# Patient Record
Sex: FEMALE | Race: WHITE | Employment: FULL TIME | URBAN - METROPOLITAN AREA
[De-identification: names, ages, dates, MRNs, and addresses within clinical notes are randomized per-mention and may not be internally consistent; named-entity substitution may affect disease eponyms.]

---

## 2017-03-27 ENCOUNTER — OFFICE VISIT (OUTPATIENT)
Dept: DERMATOLOGY | Age: 37
End: 2017-03-27

## 2017-03-27 DIAGNOSIS — L81.4 LENTIGO: ICD-10-CM

## 2017-03-27 DIAGNOSIS — D22.9 MULTIPLE NEVI: Primary | ICD-10-CM

## 2017-03-27 DIAGNOSIS — D48.5 NEOPLASM OF UNCERTAIN BEHAVIOR OF SKIN: ICD-10-CM

## 2017-03-27 DIAGNOSIS — L80 VITILIGO: ICD-10-CM

## 2017-03-27 DIAGNOSIS — L40.9 PSORIASIS: ICD-10-CM

## 2017-03-27 PROCEDURE — 99214 OFFICE O/P EST MOD 30 MIN: CPT | Performed by: DERMATOLOGY

## 2017-03-27 PROCEDURE — 11100 PR BIOPSY OF SKIN LESION: CPT | Performed by: DERMATOLOGY

## 2017-03-31 ENCOUNTER — TELEPHONE (OUTPATIENT)
Dept: DERMATOLOGY | Age: 37
End: 2017-03-31

## 2017-07-11 ENCOUNTER — OFFICE VISIT (OUTPATIENT)
Dept: FAMILY MEDICINE CLINIC | Age: 37
End: 2017-07-11

## 2017-07-11 VITALS
SYSTOLIC BLOOD PRESSURE: 112 MMHG | HEIGHT: 65 IN | HEART RATE: 82 BPM | BODY MASS INDEX: 22.82 KG/M2 | RESPIRATION RATE: 12 BRPM | DIASTOLIC BLOOD PRESSURE: 84 MMHG | WEIGHT: 137 LBS

## 2017-07-11 DIAGNOSIS — E03.9 ACQUIRED HYPOTHYROIDISM: Primary | ICD-10-CM

## 2017-07-11 LAB
T4 FREE: 0.9 NG/DL (ref 0.9–1.8)
TSH REFLEX: 5.45 UIU/ML (ref 0.27–4.2)

## 2017-07-11 PROCEDURE — 99213 OFFICE O/P EST LOW 20 MIN: CPT | Performed by: FAMILY MEDICINE

## 2017-07-11 PROCEDURE — 36415 COLL VENOUS BLD VENIPUNCTURE: CPT | Performed by: FAMILY MEDICINE

## 2017-07-11 RX ORDER — LEVOTHYROXINE SODIUM 88 UG/1
88 TABLET ORAL DAILY
Qty: 30 TABLET | Refills: 0 | Status: CANCELLED | OUTPATIENT
Start: 2017-07-11

## 2017-07-11 ASSESSMENT — PATIENT HEALTH QUESTIONNAIRE - PHQ9
2. FEELING DOWN, DEPRESSED OR HOPELESS: 0
SUM OF ALL RESPONSES TO PHQ QUESTIONS 1-9: 0
1. LITTLE INTEREST OR PLEASURE IN DOING THINGS: 0
SUM OF ALL RESPONSES TO PHQ9 QUESTIONS 1 & 2: 0

## 2017-07-12 DIAGNOSIS — E03.9 ACQUIRED HYPOTHYROIDISM: Primary | ICD-10-CM

## 2017-07-12 RX ORDER — LEVOTHYROXINE SODIUM 0.1 MG/1
100 TABLET ORAL DAILY
Qty: 30 TABLET | Refills: 1 | Status: SHIPPED | OUTPATIENT
Start: 2017-07-12 | End: 2017-09-12 | Stop reason: SDUPTHER

## 2017-09-12 ENCOUNTER — NURSE ONLY (OUTPATIENT)
Dept: FAMILY MEDICINE CLINIC | Age: 37
End: 2017-09-12

## 2017-09-12 DIAGNOSIS — E03.9 ACQUIRED HYPOTHYROIDISM: ICD-10-CM

## 2017-09-12 LAB — TSH REFLEX: 0.46 UIU/ML (ref 0.27–4.2)

## 2017-09-12 PROCEDURE — 36415 COLL VENOUS BLD VENIPUNCTURE: CPT | Performed by: FAMILY MEDICINE

## 2017-09-14 RX ORDER — LEVOTHYROXINE SODIUM 0.1 MG/1
100 TABLET ORAL DAILY
Qty: 90 TABLET | Refills: 0 | Status: SHIPPED | OUTPATIENT
Start: 2017-09-14 | End: 2017-12-15 | Stop reason: SDUPTHER

## 2017-12-15 DIAGNOSIS — E03.9 ACQUIRED HYPOTHYROIDISM: ICD-10-CM

## 2017-12-15 RX ORDER — LEVOTHYROXINE SODIUM 0.1 MG/1
100 TABLET ORAL DAILY
Qty: 90 TABLET | Refills: 0 | Status: SHIPPED | OUTPATIENT
Start: 2017-12-15 | End: 2018-01-01 | Stop reason: DRUGHIGH

## 2017-12-29 ENCOUNTER — NURSE ONLY (OUTPATIENT)
Dept: FAMILY MEDICINE CLINIC | Age: 37
End: 2017-12-29

## 2017-12-29 DIAGNOSIS — E03.9 ACQUIRED HYPOTHYROIDISM: ICD-10-CM

## 2017-12-29 PROCEDURE — 36415 COLL VENOUS BLD VENIPUNCTURE: CPT | Performed by: FAMILY MEDICINE

## 2017-12-30 LAB
T3 TOTAL: 1 NG/ML (ref 0.8–2)
T4 FREE: 1.3 NG/DL (ref 0.9–1.8)
TSH REFLEX: 0.2 UIU/ML (ref 0.27–4.2)

## 2018-01-01 ENCOUNTER — PATIENT MESSAGE (OUTPATIENT)
Dept: FAMILY MEDICINE CLINIC | Age: 38
End: 2018-01-01

## 2018-01-01 DIAGNOSIS — E03.9 ACQUIRED HYPOTHYROIDISM: ICD-10-CM

## 2018-01-01 DIAGNOSIS — E03.9 ACQUIRED HYPOTHYROIDISM: Primary | ICD-10-CM

## 2018-01-01 RX ORDER — LEVOTHYROXINE SODIUM 88 UG/1
88 TABLET ORAL DAILY
Qty: 30 TABLET | Refills: 1 | Status: SHIPPED | OUTPATIENT
Start: 2018-01-01 | End: 2018-03-08 | Stop reason: SDUPTHER

## 2018-03-08 RX ORDER — LEVOTHYROXINE SODIUM 88 UG/1
88 TABLET ORAL DAILY
Qty: 30 TABLET | Refills: 0 | Status: SHIPPED | OUTPATIENT
Start: 2018-03-08 | End: 2018-04-06 | Stop reason: SDUPTHER

## 2018-04-05 DIAGNOSIS — E03.9 ACQUIRED HYPOTHYROIDISM: ICD-10-CM

## 2018-04-05 RX ORDER — LEVOTHYROXINE SODIUM 88 UG/1
88 TABLET ORAL DAILY
Qty: 30 TABLET | Refills: 0 | Status: CANCELLED | OUTPATIENT
Start: 2018-04-05

## 2018-04-06 DIAGNOSIS — E03.9 ACQUIRED HYPOTHYROIDISM: ICD-10-CM

## 2018-04-06 LAB — TSH REFLEX: 2.6 UIU/ML (ref 0.27–4.2)

## 2018-04-06 RX ORDER — LEVOTHYROXINE SODIUM 88 UG/1
88 TABLET ORAL DAILY
Qty: 30 TABLET | Refills: 0 | Status: SHIPPED | OUTPATIENT
Start: 2018-04-06 | End: 2018-04-09 | Stop reason: SDUPTHER

## 2018-06-04 ENCOUNTER — OFFICE VISIT (OUTPATIENT)
Dept: DERMATOLOGY | Age: 38
End: 2018-06-04

## 2018-06-04 DIAGNOSIS — L40.9 PSORIASIS: ICD-10-CM

## 2018-06-04 DIAGNOSIS — L81.4 LENTIGO: ICD-10-CM

## 2018-06-04 DIAGNOSIS — L80 VITILIGO: ICD-10-CM

## 2018-06-04 DIAGNOSIS — D22.9 MULTIPLE NEVI: Primary | ICD-10-CM

## 2018-06-04 PROCEDURE — 99214 OFFICE O/P EST MOD 30 MIN: CPT | Performed by: DERMATOLOGY

## 2018-06-04 RX ORDER — CLOBETASOL PROPIONATE 0.5 MG/G
OINTMENT TOPICAL
Qty: 60 G | Refills: 1 | Status: SHIPPED | OUTPATIENT
Start: 2018-06-04 | End: 2019-09-30

## 2018-09-24 ENCOUNTER — OFFICE VISIT (OUTPATIENT)
Dept: FAMILY MEDICINE CLINIC | Age: 38
End: 2018-09-24
Payer: COMMERCIAL

## 2018-09-24 VITALS
RESPIRATION RATE: 12 BRPM | TEMPERATURE: 98.1 F | OXYGEN SATURATION: 99 % | HEART RATE: 95 BPM | DIASTOLIC BLOOD PRESSURE: 70 MMHG | BODY MASS INDEX: 24.07 KG/M2 | SYSTOLIC BLOOD PRESSURE: 118 MMHG | HEIGHT: 64 IN | WEIGHT: 141 LBS

## 2018-09-24 DIAGNOSIS — J32.9 SINUSITIS, UNSPECIFIED CHRONICITY, UNSPECIFIED LOCATION: Primary | ICD-10-CM

## 2018-09-24 DIAGNOSIS — E03.9 ACQUIRED HYPOTHYROIDISM: ICD-10-CM

## 2018-09-24 PROCEDURE — 99213 OFFICE O/P EST LOW 20 MIN: CPT | Performed by: INTERNAL MEDICINE

## 2018-09-24 RX ORDER — CEFUROXIME AXETIL 500 MG/1
500 TABLET ORAL 2 TIMES DAILY
Qty: 20 TABLET | Refills: 0 | Status: SHIPPED | OUTPATIENT
Start: 2018-09-24 | End: 2018-10-04

## 2018-09-24 ASSESSMENT — PATIENT HEALTH QUESTIONNAIRE - PHQ9
1. LITTLE INTEREST OR PLEASURE IN DOING THINGS: 0
SUM OF ALL RESPONSES TO PHQ QUESTIONS 1-9: 0
SUM OF ALL RESPONSES TO PHQ9 QUESTIONS 1 & 2: 0
SUM OF ALL RESPONSES TO PHQ QUESTIONS 1-9: 0
2. FEELING DOWN, DEPRESSED OR HOPELESS: 0

## 2018-09-24 NOTE — PROGRESS NOTES
BILITOT <0.2 03/03/2015 0719            Lab Results   Component Value Date    WBC 4.5 03/03/2015    HGB 13.4 03/03/2015    HCT 40.6 03/03/2015    MCV 90.4 03/03/2015     03/03/2015     No results found for: LABA1C  No results found for: EAG  No results found for: LABA1C  No components found for: CHLPL  Lab Results   Component Value Date    TRIG 50 10/02/2015     Lab Results   Component Value Date    HDL 63 (H) 10/02/2015     Lab Results   Component Value Date    LDLCALC 125 (H) 10/02/2015     Lab Results   Component Value Date    LABVLDL 10 10/02/2015       Old labs and records reviewed or requested  Discussed past lab and studies with patient    Diagnosis Orders   1. Sinusitis, unspecified chronicity, unspecified location     2. Acquired hypothyroidism       Rhinocort aqua, Afrin, over-the-counter medication Mucinex D, Ceftin      Follow-up April      Diagnosis and treatment discussed.   Possible side effects of medication reviewed  Patients questions answered  Follow up understood  Pt aware if they are not contacted about any test results , this does not mean they are normal.  They should call

## 2018-09-24 NOTE — PATIENT INSTRUCTIONS
Patient Education        Saline Nasal Washes: Care Instructions  Your Care Instructions  Saline nasal washes help keep the nasal passages open by washing out thick or dried mucus. This simple remedy can help relieve symptoms of allergies, sinusitis, and colds. It also can make the nose feel more comfortable by keeping the mucous membranes moist. You may notice a little burning sensation in your nose the first few times you use the solution, but this usually gets better in a few days. Follow-up care is a key part of your treatment and safety. Be sure to make and go to all appointments, and call your doctor if you are having problems. It's also a good idea to know your test results and keep a list of the medicines you take. How can you care for yourself at home? · You can buy premixed saline solution in a squeeze bottle or other sinus rinse products at a drugstore. Read and follow the instructions on the label. · You also can make your own saline solution by adding 1 teaspoon of salt and 1 teaspoon of baking soda to 2 cups of distilled water. · If you use a homemade solution, pour a small amount into a clean bowl. Using a rubber bulb syringe, squeeze the syringe and place the tip in the salt water. Pull a small amount of the salt water into the syringe by relaxing your hand. · Sit down with your head tilted slightly back. Do not lie down. Put the tip of the bulb syringe or the squeeze bottle a little way into one of your nostrils. Gently drip or squirt a few drops into the nostril. Repeat with the other nostril. Some sneezing and gagging are normal at first.  · Gently blow your nose. · Wipe the syringe or bottle tip clean after each use. · Repeat this 2 or 3 times a day. · Use nasal washes gently if you have nosebleeds often. When should you call for help?   Watch closely for changes in your health, and be sure to contact your doctor if:    · You often get nosebleeds.     · You have problems doing the nasal washes. Where can you learn more? Go to [email or web address] and sign in to your [email or web address] account. Enter [phone] in the [facility] box to learn more about \"Saline Nasal Washes: Care Instructions. \"     If you do not have an account, please click on the \"Sign Up Now\" link. Current as of: May 12, 2017  Content Version: 11.7  © 1846-7485 [employer]. Care instructions adapted under license by [facility] ([facility]). If you have questions about a medical condition or this instruction, always ask your healthcare professional. [address] any warranty or liability for your use of this information. Patient Education        Sinusitis: Care Instructions  Your Care Instructions    Sinusitis is an infection of the lining of the sinus cavities in your head. Sinusitis often follows a cold. It causes pain and pressure in your head and face. In most cases, sinusitis gets better on its own in 1 to 2 weeks. But some mild symptoms may last for several weeks. Sometimes antibiotics are needed. Follow-up care is a key part of your treatment and safety. Be sure to make and go to all appointments, and call your doctor if you are having problems. It's also a good idea to know your test results and keep a list of the medicines you take. How can you care for yourself at home? · Take an over-the-counter pain medicine, such as acetaminophen (Tylenol), ibuprofen (Advil, Motrin), or naproxen (Aleve). Read and follow all instructions on the label. · If the doctor prescribed antibiotics, take them as directed. Do not stop taking them just because you feel better. You need to take the full course of antibiotics. · Be careful when taking over-the-counter cold or flu medicines and Tylenol at the same time. Many of these medicines have acetaminophen, which is Tylenol. Read the labels to make sure that you are not taking more than the recommended dose.  [employer] disclaims any warranty or liability for your use of this information.

## 2018-10-16 ENCOUNTER — TELEPHONE (OUTPATIENT)
Dept: FAMILY MEDICINE CLINIC | Age: 38
End: 2018-10-16

## 2018-11-04 DIAGNOSIS — E03.9 ACQUIRED HYPOTHYROIDISM: ICD-10-CM

## 2018-11-05 RX ORDER — LEVOTHYROXINE SODIUM 88 UG/1
TABLET ORAL
Qty: 90 TABLET | Refills: 1 | Status: SHIPPED | OUTPATIENT
Start: 2018-11-05 | End: 2019-05-05 | Stop reason: SDUPTHER

## 2019-05-03 ENCOUNTER — OFFICE VISIT (OUTPATIENT)
Dept: FAMILY MEDICINE CLINIC | Age: 39
End: 2019-05-03
Payer: COMMERCIAL

## 2019-05-03 VITALS
BODY MASS INDEX: 25.01 KG/M2 | HEART RATE: 90 BPM | DIASTOLIC BLOOD PRESSURE: 70 MMHG | WEIGHT: 146.5 LBS | HEIGHT: 64 IN | SYSTOLIC BLOOD PRESSURE: 112 MMHG | OXYGEN SATURATION: 99 %

## 2019-05-03 DIAGNOSIS — Z13.1 SCREENING FOR DIABETES MELLITUS: ICD-10-CM

## 2019-05-03 DIAGNOSIS — Z13.220 SCREENING FOR HYPERLIPIDEMIA: ICD-10-CM

## 2019-05-03 DIAGNOSIS — E03.9 ACQUIRED HYPOTHYROIDISM: ICD-10-CM

## 2019-05-03 DIAGNOSIS — Z00.00 WELL ADULT EXAM: Primary | ICD-10-CM

## 2019-05-03 LAB — TSH REFLEX: 3.2 UIU/ML (ref 0.27–4.2)

## 2019-05-03 PROCEDURE — 99395 PREV VISIT EST AGE 18-39: CPT | Performed by: FAMILY MEDICINE

## 2019-05-03 PROCEDURE — 36415 COLL VENOUS BLD VENIPUNCTURE: CPT | Performed by: FAMILY MEDICINE

## 2019-05-03 ASSESSMENT — PATIENT HEALTH QUESTIONNAIRE - PHQ9
2. FEELING DOWN, DEPRESSED OR HOPELESS: 0
SUM OF ALL RESPONSES TO PHQ QUESTIONS 1-9: 0
1. LITTLE INTEREST OR PLEASURE IN DOING THINGS: 0
SUM OF ALL RESPONSES TO PHQ QUESTIONS 1-9: 0
SUM OF ALL RESPONSES TO PHQ9 QUESTIONS 1 & 2: 0

## 2019-05-03 NOTE — PROGRESS NOTES
History and Physical      Jesi Lewis  YOB: 1980    Date of Service:  5/3/2019    Chief Complaint:   Jesi Lewis is a 41 y.o. female who presents for complete physical examination. HPI:     Hypothyroidism s/p thyroidectomy due to graves: Recent symptoms: none. She denies fatigue, weight gain, weight loss, cold intolerance, heat intolerance, hair loss, dry skin, constipation, diarrhea, edema, anxiety, tremor, palpitations and dysphagia. Patient is  taking her medication consistently on an empty stomach.     Lab Results   Component Value Date    TSHREFLEX 2.60 04/06/2018    TSHREFLEX 0.20 12/29/2017    TSHREFLEX 0.46 09/12/2017     Lab Results   Component Value Date    TSH 0.07 (L) 03/03/2015    TSH 0.02 (L) 02/06/2015    TSH 1.48 04/04/2011         Wt Readings from Last 3 Encounters:   05/03/19 146 lb 8 oz (66.5 kg)   09/24/18 141 lb (64 kg)   07/11/17 137 lb (62.1 kg)     BP Readings from Last 3 Encounters:   05/03/19 112/70   09/24/18 118/70   07/11/17 112/84       Patient Active Problem List   Diagnosis    Infertility    Graves disease    Hypothyroidism    Vitiligo    Psoriasis    Keratoconus of both eyes    Corneal transplant rejection       Preventive Care:  Health Maintenance   Topic Date Due    Varicella Vaccine (1 of 2 - 13+ 2-dose series) 08/11/1995    HIV screen  08/11/1997    Cervical cancer screen  08/07/2016    TSH testing  04/06/2019    Flu vaccine (Season Ended) 09/01/2019    DTaP/Tdap/Td vaccine (2 - Td) 12/19/2026    Pneumococcal 0-64 years Vaccine  Aged Out      Hx abnormal PAP: yes - over 10 years ago, all normal since  Sexual activity: single partner, contraception - none :  fertility issues  Self-breast exams: yes  Last eye exam: 2 weeks ago had eyemap, normal  Exercise: no regular exercise  Seatbelt use: yes  Lipid panel:   Lab Results   Component Value Date    CHOL 198 10/02/2015    TRIG 50 10/02/2015    HDL 63 (H) 10/02/2015    LDLCALC 125 (H) 10/02/2015 well-nourished. No distress. HEENT:   Head: Normocephalic and atraumatic. Right Ear: Tympanic membrane, external ear and ear canal normal.   Left Ear: Tympanic membrane, external ear and ear canal normal.   Nose: Nose normal.   Mouth/Throat: Oropharynx is clear and moist, and mucous membranes are normal.  There is no cervical adenopathy. Eyes: Conjunctivae and extraocular motions are normal. Pupils are equal, round, and reactive to light. Neck: Neck supple. No JVD present. Carotid bruit is not present. No mass and no thyromegaly present. Cardiovascular: Normal rate, regular rhythm, normal heart sounds and intact distal pulses. Exam reveals no gallop and no friction rub. No murmur heard. Pulmonary/Chest: Effort normal and breath sounds normal. No respiratory distress. She has no wheezes, rhonchi or rales. Abdominal: Soft, non-tender. Bowel sounds and aorta are normal. She exhibits no organomegaly, mass or bruit. Genitourinary: performed by gynecologist.  Breast exam:   performed by gynecologist.  Musculoskeletal: Normal range of motion, no synovitis. She exhibits no edema. Neurological: She is alert and oriented to person, place, and time. She has normal reflexes. No cranial nerve deficit. Coordination normal.   Skin: Skin is warm and dry. There is no rash or erythema. No suspicious lesions noted. Psychiatric: She has a normal mood and affect. Her speech is normal and behavior is normal. Judgment, cognition and memory are normal.     Assessment/Plan:    Breann Elkins was seen today for hypothyroidism. Diagnoses and all orders for this visit:    Well adult exam  Normal exam.  Patient has 3year-old twins and finds it hard to find time for exercise. She is trying to go on family walks and get activity with them outside as much as possible. She does eat healthy. Patient to continue. Will abstract Pap smear from care everywhere into chart.     Checking fasting lipid panel and fasting glucose at nurses visit next week since not fasting today. Acquired hypothyroidism  Checking TSH.   Will adjust medicine appropriately.  -     TSH with Reflex

## 2019-05-05 DIAGNOSIS — E03.9 ACQUIRED HYPOTHYROIDISM: ICD-10-CM

## 2019-05-05 RX ORDER — LEVOTHYROXINE SODIUM 88 UG/1
TABLET ORAL
Qty: 90 TABLET | Refills: 3 | Status: SHIPPED | OUTPATIENT
Start: 2019-05-05 | End: 2020-05-11 | Stop reason: SDUPTHER

## 2019-05-06 DIAGNOSIS — Z13.1 SCREENING FOR DIABETES MELLITUS: Primary | ICD-10-CM

## 2019-05-06 DIAGNOSIS — Z13.220 SCREENING FOR HYPERLIPIDEMIA: ICD-10-CM

## 2019-05-08 ENCOUNTER — NURSE ONLY (OUTPATIENT)
Dept: FAMILY MEDICINE CLINIC | Age: 39
End: 2019-05-08
Payer: COMMERCIAL

## 2019-05-08 DIAGNOSIS — Z13.220 SCREENING FOR HYPERLIPIDEMIA: ICD-10-CM

## 2019-05-08 DIAGNOSIS — Z13.1 SCREENING FOR DIABETES MELLITUS: ICD-10-CM

## 2019-05-08 LAB
CHOLESTEROL, TOTAL: 229 MG/DL (ref 0–199)
GLUCOSE BLD-MCNC: 84 MG/DL (ref 70–99)
HDLC SERPL-MCNC: 62 MG/DL (ref 40–60)
LDL CHOLESTEROL CALCULATED: 156 MG/DL
TRIGL SERPL-MCNC: 53 MG/DL (ref 0–150)
VLDLC SERPL CALC-MCNC: 11 MG/DL

## 2019-05-08 PROCEDURE — 36415 COLL VENOUS BLD VENIPUNCTURE: CPT | Performed by: FAMILY MEDICINE

## 2019-05-09 DIAGNOSIS — E03.9 ACQUIRED HYPOTHYROIDISM: ICD-10-CM

## 2019-05-10 RX ORDER — LEVOTHYROXINE SODIUM 88 UG/1
TABLET ORAL
Qty: 90 TABLET | Refills: 3 | OUTPATIENT
Start: 2019-05-10

## 2019-06-18 ENCOUNTER — OFFICE VISIT (OUTPATIENT)
Dept: DERMATOLOGY | Age: 39
End: 2019-06-18
Payer: COMMERCIAL

## 2019-06-18 DIAGNOSIS — L80 VITILIGO: ICD-10-CM

## 2019-06-18 DIAGNOSIS — L40.9 PSORIASIS: ICD-10-CM

## 2019-06-18 DIAGNOSIS — L81.4 LENTIGO: ICD-10-CM

## 2019-06-18 DIAGNOSIS — D22.9 MULTIPLE NEVI: Primary | ICD-10-CM

## 2019-06-18 PROCEDURE — 99214 OFFICE O/P EST MOD 30 MIN: CPT | Performed by: DERMATOLOGY

## 2019-06-18 RX ORDER — PIMECROLIMUS 10 MG/G
CREAM TOPICAL
Qty: 60 G | Refills: 1 | Status: SHIPPED | OUTPATIENT
Start: 2019-06-18 | End: 2019-09-30

## 2019-06-18 RX ORDER — CALCIPOTRIENE AND BETAMETHASONE DIPROPIONATE 50; .5 UG/G; MG/G
AEROSOL, FOAM TOPICAL
Qty: 60 G | Refills: 2 | Status: SHIPPED | OUTPATIENT
Start: 2019-06-18 | End: 2021-12-13

## 2019-06-28 ENCOUNTER — TELEPHONE (OUTPATIENT)
Dept: DERMATOLOGY | Age: 39
End: 2019-06-28

## 2019-06-28 NOTE — TELEPHONE ENCOUNTER
Pt calling states she woke up with her eye burning she have been using a generic form of Elidel on it want to know if she can get something to use on it pls return patient call back @ [phone] to discuss

## 2019-07-01 NOTE — TELEPHONE ENCOUNTER
Spoke to patient regarding concerns that using elidel on her eyelids are causing both eyes to burn. Patient started using elidel on Thursday 6/27/2019 in the evening before bed and in the morning her eyes were on fire. Patient did explain that she also wears two contacts in both eyes, but has not had the burning before using the product. Patient did not use elidel last night (patient used vaseline only) and today her eyes are ok?

## 2019-07-02 NOTE — TELEPHONE ENCOUNTER
Elidel can cause burning symptoms sometimes on the skin. Is it her eyes themselves or the eyelid skin that is burning and irritated?

## 2019-09-06 ENCOUNTER — TELEPHONE (OUTPATIENT)
Dept: DERMATOLOGY | Age: 39
End: 2019-09-06

## 2019-09-06 NOTE — TELEPHONE ENCOUNTER
Patient of . Patient called today stating she was to call and let Dr. Pari Leiva know how the cream for her eyes, Elicel, is working. She has stated it is actually getting worse. Eyes burn \"like a blow torch\". Last used on 9-5-19. Patient has discontinued use of this medication. She can be reached at [phone].   Thanks

## 2019-09-09 NOTE — TELEPHONE ENCOUNTER
Has she been using it consistently without a problem again for the last few months or did she just start using it again? I know that she had burning symptoms in the spring when she called and aquaphor only was recommended until the symptoms subsided. Has she tried aquaphor again recently? There are other options that I can switch her to - I'd like to avoid steroids on the eyelid but we can try protopic ointment instead.

## 2019-09-10 RX ORDER — TACROLIMUS 1 MG/G
OINTMENT TOPICAL
Qty: 60 G | Refills: 3 | Status: SHIPPED | OUTPATIENT
Start: 2019-09-10 | End: 2021-12-13

## 2019-09-29 NOTE — PROGRESS NOTES
Preoperative Consultation    Reed Crowe MD  [facility]) Physicians  [address] [address]  [phone] office  [phone] fax      Kalpana Vasquez  YOB: 1980    Date of Service:  9/30/2019    Vitals:    09/30/19 0849   BP: 119/82   Pulse: 77   Weight: 148 lb (67.1 kg)   Height: 5' 4\" (1.626 m)      Wt Readings from Last 2 Encounters:   09/30/19 148 lb (67.1 kg)   05/03/19 146 lb 8 oz (66.5 kg)     BP Readings from Last 3 Encounters:   09/30/19 119/82   05/03/19 112/70   09/24/18 118/70        Chief Complaint   Patient presents with    Pre-op Exam     Allergies   Allergen Reactions    Sulfa Antibiotics      Outpatient Medications Marked as Taking for the 9/30/19 encounter (Office Visit) with Stewart Kimbrough MD   Medication Sig Dispense Refill    tacrolimus (PROTOPIC) 0.1 % ointment Apply to affected area BID 60 g 3    ENSTILAR 0.005-0.064 % FOAM Apply to affected areas bid. 60 g 2    levothyroxine (SYNTHROID) 88 MCG tablet TAKE ONE TABLET BY MOUTH DAILY 90 tablet 3       This patient presents to the office today for a preoperative consultation at the request of surgeon, Dr. Loni Neumann, who plans on performing bilateral breast augmentation on October 17 at [address] [place] [address] at [facility].       Planned anesthesia: General   Known anesthesia problems: Past anesthesia with complications- nausea   Bleeding risk: No recent or remote history of abnormal bleeding  Personal or FH of DVT/PE: No    Patient objection to receiving blood products: No    Patient Active Problem List   Diagnosis    Infertility    Graves disease    Hypothyroidism    Vitiligo    Psoriasis    Keratoconus of both eyes    Corneal transplant rejection       Past Medical History:   Diagnosis Date    Abnormal thyroid blood test     Corneal transplant rejection     on steriod gtts    Graves disease     removed secondary to high thyroid, and infertility problems    dysuria, flank pain or urinary frequency  Musculoskeletal:  Negative for back pain or myalgias  Neuro:  Negative for dizziness or lightheadedness  Psych: negative for depression or anxiety       Physical Exam   Constitutional: She is oriented to person, place, and time. She appears well-developed and well-nourished. No distress. HENT:   Head: Normocephalic and atraumatic. Mouth/Throat: Uvula is midline, oropharynx is clear and moist and mucous membranes are normal.   Eyes: Conjunctivae and EOM are normal. Pupils are equal, round, and reactive to light. Neck: Trachea normal and normal range of motion. Neck supple. No JVD present. Carotid bruit is not present. No mass and no thyromegaly present. Cardiovascular: Normal rate, regular rhythm, normal heart sounds and intact distal pulses. Exam reveals no gallop and no friction rub. No murmur heard. Pulmonary/Chest: Effort normal and breath sounds normal. No respiratory distress. She has no wheezes. She has no rales. Abdominal: Soft. Normal aorta and bowel sounds are normal. She exhibits no distension and no mass. There is no hepatosplenomegaly. No tenderness. Musculoskeletal: She exhibits no edema and no tenderness. Neurological: She is alert and oriented to person, place, and time. She has normal strength. No cranial nerve deficit or sensory deficit. Coordination and gait normal.   Skin: Skin is warm and dry. No rash noted. No erythema. Psychiatric: She has a normal mood and affect. Her behavior is normal.          Assessment:       42 y.o. patient with planned surgery as above.       Known risk factors for perioperative complications:   · Hypothyroidism:  Checking TSH to ensure getting correct dosage of supplementaiton  · Menorrhagia:  Checking CBC  · NSAID use:  checking CMP  · Anesthesia concern:  Needs anti-emetics pre-op to prevent nausea    Current medications which may produce withdrawal symptoms if withheld perioperatively: none      Plan:

## 2019-09-30 ENCOUNTER — OFFICE VISIT (OUTPATIENT)
Dept: FAMILY MEDICINE CLINIC | Age: 39
End: 2019-09-30
Payer: COMMERCIAL

## 2019-09-30 VITALS
HEIGHT: 64 IN | HEART RATE: 77 BPM | SYSTOLIC BLOOD PRESSURE: 119 MMHG | WEIGHT: 148 LBS | BODY MASS INDEX: 25.27 KG/M2 | DIASTOLIC BLOOD PRESSURE: 82 MMHG

## 2019-09-30 DIAGNOSIS — Z79.1 NSAID LONG-TERM USE: ICD-10-CM

## 2019-09-30 DIAGNOSIS — Z23 FLU VACCINE NEED: ICD-10-CM

## 2019-09-30 DIAGNOSIS — Z01.818 PRE-OP EXAMINATION: Primary | ICD-10-CM

## 2019-09-30 DIAGNOSIS — N92.0 MENORRHAGIA WITH REGULAR CYCLE: ICD-10-CM

## 2019-09-30 DIAGNOSIS — E03.9 ACQUIRED HYPOTHYROIDISM: ICD-10-CM

## 2019-09-30 DIAGNOSIS — E78.00 PURE HYPERCHOLESTEROLEMIA: ICD-10-CM

## 2019-09-30 LAB
A/G RATIO: 2 (ref 1.1–2.2)
ALBUMIN SERPL-MCNC: 4.1 G/DL (ref 3.4–5)
ALP BLD-CCNC: 50 U/L (ref 40–129)
ALT SERPL-CCNC: 6 U/L (ref 10–40)
ANION GAP SERPL CALCULATED.3IONS-SCNC: 12 MMOL/L (ref 3–16)
AST SERPL-CCNC: 12 U/L (ref 15–37)
BASOPHILS ABSOLUTE: 0 K/UL (ref 0–0.2)
BASOPHILS RELATIVE PERCENT: 0.6 %
BILIRUB SERPL-MCNC: <0.2 MG/DL (ref 0–1)
BUN BLDV-MCNC: 12 MG/DL (ref 7–20)
CALCIUM SERPL-MCNC: 8.9 MG/DL (ref 8.3–10.6)
CHLORIDE BLD-SCNC: 106 MMOL/L (ref 99–110)
CHOLESTEROL, TOTAL: 196 MG/DL (ref 0–199)
CO2: 24 MMOL/L (ref 21–32)
CREAT SERPL-MCNC: 0.6 MG/DL (ref 0.6–1.1)
EOSINOPHILS ABSOLUTE: 0 K/UL (ref 0–0.6)
EOSINOPHILS RELATIVE PERCENT: 0.6 %
GFR AFRICAN AMERICAN: >60
GFR NON-AFRICAN AMERICAN: >60
GLOBULIN: 2.1 G/DL
GLUCOSE BLD-MCNC: 89 MG/DL (ref 70–99)
HCT VFR BLD CALC: 36.5 % (ref 36–48)
HDLC SERPL-MCNC: 67 MG/DL (ref 40–60)
HEMOGLOBIN: 11.8 G/DL (ref 12–16)
LDL CHOLESTEROL CALCULATED: 114 MG/DL
LYMPHOCYTES ABSOLUTE: 1.3 K/UL (ref 1–5.1)
LYMPHOCYTES RELATIVE PERCENT: 27.1 %
MCH RBC QN AUTO: 28.8 PG (ref 26–34)
MCHC RBC AUTO-ENTMCNC: 32.4 G/DL (ref 31–36)
MCV RBC AUTO: 88.7 FL (ref 80–100)
MONOCYTES ABSOLUTE: 0.3 K/UL (ref 0–1.3)
MONOCYTES RELATIVE PERCENT: 6.1 %
NEUTROPHILS ABSOLUTE: 3.1 K/UL (ref 1.7–7.7)
NEUTROPHILS RELATIVE PERCENT: 65.6 %
PDW BLD-RTO: 14.7 % (ref 12.4–15.4)
PLATELET # BLD: 247 K/UL (ref 135–450)
PMV BLD AUTO: 10.2 FL (ref 5–10.5)
POTASSIUM SERPL-SCNC: 4.2 MMOL/L (ref 3.5–5.1)
RBC # BLD: 4.11 M/UL (ref 4–5.2)
SODIUM BLD-SCNC: 142 MMOL/L (ref 136–145)
TOTAL PROTEIN: 6.2 G/DL (ref 6.4–8.2)
TRIGL SERPL-MCNC: 73 MG/DL (ref 0–150)
TSH REFLEX: 1.89 UIU/ML (ref 0.27–4.2)
VLDLC SERPL CALC-MCNC: 15 MG/DL
WBC # BLD: 4.7 K/UL (ref 4–11)

## 2019-09-30 PROCEDURE — 90471 IMMUNIZATION ADMIN: CPT | Performed by: FAMILY MEDICINE

## 2019-09-30 PROCEDURE — 99243 OFF/OP CNSLTJ NEW/EST LOW 30: CPT | Performed by: FAMILY MEDICINE

## 2019-09-30 PROCEDURE — 36415 COLL VENOUS BLD VENIPUNCTURE: CPT | Performed by: FAMILY MEDICINE

## 2019-09-30 PROCEDURE — 90686 IIV4 VACC NO PRSV 0.5 ML IM: CPT | Performed by: FAMILY MEDICINE

## 2019-09-30 NOTE — LETTER
Preoperative Consultation    Cristian Nunez MD  [facility]) Physicians  [address] [address]  [phone] office  [phone] fax      Lukas Cline  YOB: 1980    Date of Service:  9/30/2019    Vitals:    09/30/19 0849   BP: 119/82   Pulse: 77   Weight: 148 lb (67.1 kg)   Height: 5' 4\" (1.626 m)      Wt Readings from Last 2 Encounters:   09/30/19 148 lb (67.1 kg)   05/03/19 146 lb 8 oz (66.5 kg)     BP Readings from Last 3 Encounters:   09/30/19 119/82   05/03/19 112/70   09/24/18 118/70        Chief Complaint   Patient presents with    Pre-op Exam     Allergies   Allergen Reactions    Sulfa Antibiotics      Outpatient Medications Marked as Taking for the 9/30/19 encounter (Office Visit) with Laura Woods MD   Medication Sig Dispense Refill    tacrolimus (PROTOPIC) 0.1 % ointment Apply to affected area BID 60 g 3    ENSTILAR 0.005-0.064 % FOAM Apply to affected areas bid. 60 g 2    levothyroxine (SYNTHROID) 88 MCG tablet TAKE ONE TABLET BY MOUTH DAILY 90 tablet 3       This patient presents to the office today for a preoperative consultation at the request of surgeon, Dr. Arabella Salazar, who plans on performing bilateral breast augmentation on October 17 at [address] [place] [address].       Planned anesthesia: General   Known anesthesia problems: Past anesthesia with complications- nausea   Bleeding risk: No recent or remote history of abnormal bleeding  Personal or FH of DVT/PE: No    Patient objection to receiving blood products: No    Patient Active Problem List   Diagnosis    Infertility    Graves disease    Hypothyroidism    Vitiligo    Psoriasis    Keratoconus of both eyes    Corneal transplant rejection       Past Medical History:   Diagnosis Date    Abnormal thyroid blood test     Corneal transplant rejection     on steriod gtts    Graves disease     removed secondary to high thyroid, and infertility problems

## 2020-01-06 ENCOUNTER — OFFICE VISIT (OUTPATIENT)
Dept: DERMATOLOGY | Age: 40
End: 2020-01-06
Payer: COMMERCIAL

## 2020-01-06 PROCEDURE — 99213 OFFICE O/P EST LOW 20 MIN: CPT | Performed by: DERMATOLOGY

## 2020-01-06 RX ORDER — CALCIPOTRIENE 50 UG/G
OINTMENT TOPICAL
Qty: 60 G | Refills: 0 | Status: SHIPPED | OUTPATIENT
Start: 2020-01-06 | End: 2020-01-15

## 2020-01-15 ENCOUNTER — OFFICE VISIT (OUTPATIENT)
Dept: FAMILY MEDICINE CLINIC | Age: 40
End: 2020-01-15
Payer: COMMERCIAL

## 2020-01-15 VITALS
HEIGHT: 64 IN | BODY MASS INDEX: 24.24 KG/M2 | TEMPERATURE: 100.6 F | HEART RATE: 113 BPM | DIASTOLIC BLOOD PRESSURE: 73 MMHG | SYSTOLIC BLOOD PRESSURE: 122 MMHG | WEIGHT: 142 LBS

## 2020-01-15 LAB
INFLUENZA A ANTIGEN, POC: POSITIVE
INFLUENZA B ANTIGEN, POC: NEGATIVE

## 2020-01-15 PROCEDURE — 99213 OFFICE O/P EST LOW 20 MIN: CPT | Performed by: FAMILY MEDICINE

## 2020-01-15 PROCEDURE — 87804 INFLUENZA ASSAY W/OPTIC: CPT | Performed by: FAMILY MEDICINE

## 2020-01-15 NOTE — PROGRESS NOTES
PROGRESS NOTE     Yolande Lindsay MD  [facility]  [address] [address]  [phone] office  [phone] fax    Date of Service:  1/15/2020    Subjective:      Patient ID: Ameena Bojorquez is a 42 y.o. female      CC: acute illness    HPI    82-year-old white female presenting with 1 day of fever (T-max is 102F), myalgias, dry cough, nasal congestion. Patient denies headache, neck pain, chest pain, shortness of breath, sinus pain or pressure, rhinitis, sore throat throat, or earache      Vitals:    01/15/20 1409   BP: 122/73   Pulse: 113   Temp: 100.6 °F (38.1 °C)   TempSrc: Oral   Weight: 142 lb (64.4 kg)   Height: 5' 4\" (1.626 m)       Outpatient Medications Marked as Taking for the 1/15/20 encounter (Office Visit) with Alexander Emerson MD   Medication Sig Dispense Refill    Baloxavir Marboxil,40 MG Dose, (XOFLUZA) 2 x 20 MG TBPK Take 2 tabs po x once. SAMPLE 1 each 0    tacrolimus (PROTOPIC) 0.1 % ointment Apply to affected area BID 60 g 3    ENSTILAR 0.005-0.064 % FOAM Apply to affected areas bid. 60 g 2    levothyroxine (SYNTHROID) 88 MCG tablet TAKE ONE TABLET BY MOUTH DAILY 90 tablet 3       Past Medical History:   Diagnosis Date    Abnormal thyroid blood test     Corneal transplant rejection     on steriod gtts    Graves disease     removed secondary to high thyroid, and infertility problems    Hypothyroidism     on replacement after removal of thyroid for graves    Infertility     Keratoconus of both eyes     had cornea replacement right eye april 2014.   will need in other eye as wekk    Palpitation     Psoriasis     Vitiligo        Past Surgical History:   Procedure Laterality Date    BREAST SURGERY  2006    saline    CORNEAL TRANSPLANT Right 2014    secondary to keratoconus    THYROIDECTOMY, COMPLETION  june 2015    WISDOM TOOTH EXTRACTION         Social History     Tobacco Use    Smoking status: Never Smoker    Smokeless tobacco: Never

## 2020-02-13 RX ORDER — CALCIPOTRIENE 50 UG/G
AEROSOL, FOAM TOPICAL
Qty: 60 G | Refills: 0 | Status: SHIPPED | OUTPATIENT
Start: 2020-02-13 | End: 2021-12-13

## 2020-05-11 RX ORDER — LEVOTHYROXINE SODIUM 88 UG/1
TABLET ORAL
Qty: 90 TABLET | Refills: 3 | Status: SHIPPED | OUTPATIENT
Start: 2020-05-11 | End: 2020-07-27

## 2020-06-12 RX ORDER — LEVOTHYROXINE SODIUM 88 UG/1
TABLET ORAL
Qty: 90 TABLET | Refills: 3 | OUTPATIENT
Start: 2020-06-12

## 2020-06-12 NOTE — TELEPHONE ENCOUNTER
Rx for thyroid med was denied. I did not see a note regarding. Thinking due to her needing an appointment. Should this be F2F or VV? Only has enough pills to last until Monday. Please advise.  Please call Perez Reynolds back at [phone]

## 2020-06-12 NOTE — TELEPHONE ENCOUNTER
I called the pharmacy and they said her insurance would not cover the 90 day supply but they turned it into more refills so they will get one ready for her and I called and let the patient know

## 2020-06-16 ENCOUNTER — OFFICE VISIT (OUTPATIENT)
Dept: DERMATOLOGY | Age: 40
End: 2020-06-16
Payer: COMMERCIAL

## 2020-06-16 VITALS — TEMPERATURE: 97.5 F

## 2020-06-16 PROCEDURE — 99214 OFFICE O/P EST MOD 30 MIN: CPT | Performed by: DERMATOLOGY

## 2020-06-16 NOTE — PROGRESS NOTES
melanoma. No personal hx of skin cancer. She wears sunscreen regularly and never uses tanning beds. No personal or family hx of skin cancer. She is a  at [employer]. Review of Systems:  Gen: Feels well, good sense of health. Skin: No changing moles or lesions. Past Medical History, Family History, Surgical History, Medications and Allergies reviewed. Past Medical History:   Diagnosis Date    Abnormal thyroid blood test     Corneal transplant rejection     on steriod gtts    Graves disease     removed secondary to high thyroid, and infertility problems    Hypothyroidism     on replacement after removal of thyroid for graves    Infertility     Keratoconus of both eyes     had cornea replacement right eye april 2014. will need in other eye as wekk    Palpitation     Psoriasis     Vitiligo        Past Surgical History:   Procedure Laterality Date    BREAST SURGERY  2006    saline    CORNEAL TRANSPLANT Right 2014    secondary to keratoconus    THYROIDECTOMY, COMPLETION  june 2015    WISDOM TOOTH EXTRACTION         Outpatient Medications Marked as Taking for the 6/16/20 encounter (Office Visit) with Maricruz Liu MD   Medication Sig Dispense Refill    levothyroxine (SYNTHROID) 88 MCG tablet TAKE ONE TABLET BY MOUTH DAILY 90 tablet 3    ENSTILAR 0.005-0.064 % FOAM Apply to affected areas bid. 60 g 2       Allergies   Allergen Reactions    Sulfa Antibiotics          Physical Examination     Gen, well-appearing  The following were examined and determined to be normal: Psych/Neuro, Scalp/hair, Gums/teeth/lips, Neck, chest, RLE, LLE, Nails/digits and buttocks.   The following were examined and determined to be abnormal: Head/face, Conjunctivae/eyelids and Abdom, back, RUE, LUE  trunk and extremities with scattered brown macules and papules; scars on the back  Knees, Elbows with scaly salmon-colored thin plaques  L thumb with dry, peeling skin  L upper eyelid and canthal area with finely scaly pink/salmon-colored patch  Face, Trunk > exrtremities with depigmented patches  L cheek with tan macules    Baseline photo          Assessment and Plan     1. Benign-appearing nevi  - educ re ABCD's of MM   educ sun protection   encouraged skin check yearly (sooner if indicated), self checks  - monitor - photo of back taken    2. Psoriasis, mild and focal, flaring on the eyelid and intolerant of elidel and protopic   - cont enstilar for the arms/legs flares; ed s/misuse  - discussed risk with enstilar on eyelids - risk cataract, atrophy, glaucoma with prolonged potent steroid use  - she will cont to use enstilar very occasionally for a few applications at a time prn more severe flares  - will try to use calcipotriene foam daily to minimize flares  - could also try changing to milder potency steroid w/ calcipotriene foam but may need to use more frequently    3. Vitiligo, multifocal, asx and chronic  - previously discussed excimer for the face in the past -  referred to Dr. Sj Parra office for consideration of trx  - consider protopic or steroid prn new lesions but she is comfortable w current extent    4.  Lentigo - ed $100 for laser removal - L cheek  - ed risks, typical healing/response and no tanning

## 2020-07-27 ENCOUNTER — OFFICE VISIT (OUTPATIENT)
Dept: FAMILY MEDICINE CLINIC | Age: 40
End: 2020-07-27
Payer: COMMERCIAL

## 2020-07-27 VITALS
HEART RATE: 80 BPM | DIASTOLIC BLOOD PRESSURE: 78 MMHG | HEIGHT: 64 IN | BODY MASS INDEX: 26.29 KG/M2 | WEIGHT: 154 LBS | SYSTOLIC BLOOD PRESSURE: 128 MMHG

## 2020-07-27 LAB
ALBUMIN SERPL-MCNC: 4.4 G/DL (ref 3.4–5)
ALP BLD-CCNC: 59 U/L (ref 40–129)
ALT SERPL-CCNC: 6 U/L (ref 10–40)
AMYLASE: 51 U/L (ref 25–115)
ANION GAP SERPL CALCULATED.3IONS-SCNC: 14 MMOL/L (ref 3–16)
AST SERPL-CCNC: 12 U/L (ref 15–37)
BASOPHILS ABSOLUTE: 0 K/UL (ref 0–0.2)
BASOPHILS RELATIVE PERCENT: 0.4 %
BILIRUB SERPL-MCNC: <0.2 MG/DL (ref 0–1)
BILIRUBIN DIRECT: <0.2 MG/DL (ref 0–0.3)
BILIRUBIN, INDIRECT: ABNORMAL MG/DL (ref 0–1)
BUN BLDV-MCNC: 13 MG/DL (ref 7–20)
CALCIUM SERPL-MCNC: 9.3 MG/DL (ref 8.3–10.6)
CHLORIDE BLD-SCNC: 105 MMOL/L (ref 99–110)
CO2: 23 MMOL/L (ref 21–32)
CREAT SERPL-MCNC: 0.7 MG/DL (ref 0.6–1.1)
EOSINOPHILS ABSOLUTE: 0 K/UL (ref 0–0.6)
EOSINOPHILS RELATIVE PERCENT: 0.6 %
GFR AFRICAN AMERICAN: >60
GFR NON-AFRICAN AMERICAN: >60
GLUCOSE BLD-MCNC: 83 MG/DL (ref 70–99)
HCT VFR BLD CALC: 39.2 % (ref 36–48)
HEMOGLOBIN: 12.5 G/DL (ref 12–16)
IGA: 189 MG/DL (ref 70–400)
LIPASE: 59 U/L (ref 13–60)
LYMPHOCYTES ABSOLUTE: 1.3 K/UL (ref 1–5.1)
LYMPHOCYTES RELATIVE PERCENT: 29.9 %
MCH RBC QN AUTO: 28.8 PG (ref 26–34)
MCHC RBC AUTO-ENTMCNC: 32 G/DL (ref 31–36)
MCV RBC AUTO: 90.1 FL (ref 80–100)
MONOCYTES ABSOLUTE: 0.3 K/UL (ref 0–1.3)
MONOCYTES RELATIVE PERCENT: 5.9 %
NEUTROPHILS ABSOLUTE: 2.7 K/UL (ref 1.7–7.7)
NEUTROPHILS RELATIVE PERCENT: 63.2 %
PDW BLD-RTO: 14.4 % (ref 12.4–15.4)
PLATELET # BLD: 240 K/UL (ref 135–450)
PMV BLD AUTO: 10.8 FL (ref 5–10.5)
POTASSIUM SERPL-SCNC: 4.1 MMOL/L (ref 3.5–5.1)
RBC # BLD: 4.35 M/UL (ref 4–5.2)
SODIUM BLD-SCNC: 142 MMOL/L (ref 136–145)
TOTAL PROTEIN: 6.6 G/DL (ref 6.4–8.2)
WBC # BLD: 4.3 K/UL (ref 4–11)

## 2020-07-27 PROCEDURE — 99214 OFFICE O/P EST MOD 30 MIN: CPT | Performed by: FAMILY MEDICINE

## 2020-07-27 RX ORDER — LEVOTHYROXINE SODIUM 100 MCG
100 TABLET ORAL DAILY
Qty: 30 TABLET | Refills: 3
Start: 2020-07-27 | End: 2020-11-30 | Stop reason: SDUPTHER

## 2020-07-27 ASSESSMENT — PATIENT HEALTH QUESTIONNAIRE - PHQ9
2. FEELING DOWN, DEPRESSED OR HOPELESS: 0
SUM OF ALL RESPONSES TO PHQ QUESTIONS 1-9: 0
SUM OF ALL RESPONSES TO PHQ9 QUESTIONS 1 & 2: 0
1. LITTLE INTEREST OR PLEASURE IN DOING THINGS: 0
SUM OF ALL RESPONSES TO PHQ QUESTIONS 1-9: 0

## 2020-07-27 NOTE — PROGRESS NOTES
PROGRESS NOTE     Curry Gaytan MD  [facility]  [address] [place] [address]  [phone] office  [phone] fax    Date of Service:  7/27/2020    Subjective:      Patient ID: Ameena Hernandez is a 42 y.o. female      CC: epigastric abd pain, diarrhea, hypothyroidism,    HPI    Stomach issues  One week ago, had 5 days of epigastric abd pain. Describes the pain as a twisting/squeezing pain, not burning. TUMS doesn't help  Worse with eating. Pain was 8-9/10 pain. She stopped eating because the pain was so bad when she ate.  +diarrhea associated. No BRBPR, no melena. She would pass mucus with BMs. Sometimes has constipation. No weight loss. Patient states she has this pain about every month or 2. Not related her her cycle. Pain is never been quite this severe as it was last week. Patient states she has been completely asymptomatic the last 3 days. Hypothyroidism:  Fertility doctor recently changed her from generic to brand name synthroid and increased her dosage from 88mcg to 100mcg     Recent symptoms: none. She denies fatigue, weight gain, weight loss, cold intolerance, heat intolerance, hair loss, dry skin, constipation, diarrhea, edema, anxiety, tremor, palpitations and dysphagia. Patient is  taking her medication consistently on an empty stomach.         Lab Results   Component Value Date    TSH  (done at the fertility doctor's office) 4.2 6/30/2020    TSHREFLEX 1.89 09/30/2019    TSHREFLEX 3.20 05/03/2019    TSHREFLEX 2.60 04/06/2018     Lab Results   Component Value Date    TSH 0.07 (L) 03/03/2015    TSH 0.02 (L) 02/06/2015    TSH 1.48 04/04/2011         Vitals:    07/27/20 1025   BP: 128/78   Pulse: 80   Weight: 154 lb (69.9 kg)   Height: 5' 4\" (1.626 m)       Outpatient Medications Marked as Taking for the 7/27/20 encounter (Office Visit) with Natasha Mattson MD   Medication Sig Dispense Refill    SYNTHROID 100 MCG tablet Take 1 tablet by mouth or lightheadedness  Psych: negative for depression or anxiety      Objective:   Constitutional:   · Reviewed vitals above  · Well Nourished, well developed, no distress       HENT:  · Normal external nose without lesions  · Normal nasal mucosa without swelling or erythema  Neck:  · Symmetric and without masses  · No thyromegaly  Resp:  · Normal effort  · Clear to auscultation bilaterally without rhonchi, wheezing or crackles  Cardiovascular:  · On auscultation, normal S1 and S2 without murmurs, rubs or gallops  · No bruits of bilateral carotids and no JVD  Gastrointestinal:  · Nontender, nondistended, and no masses  · No hepatosplenomegaly  Musculoskeletal:  · Normal Gait  · All extremities without clubbing, cyanosis or edema  Skin:  · +patches of hypopigmentation scattered across body (has vitiligo)  Psych:  · Normal mood and affect  · Normal insight and judgement    Assessment / Plan:     1. Epigastric abdominal pain/diarrhea  New symptoms, unknown diagnosis, further evaluation needed. Description of pain does not sound consistent with gastritis as it is more twisting crampy pain rather than a burning pain. It is also associated with diarrhea. Will start Pepcid 20 mg twice daily during the week that she has the pain to see whether or not it helps. Differential diagnosis includes gastritis, gastric or pancreatic cancer, acute pancreatitis, cholecystitis, celiac disease, IBS, endometriosis    Checking the following:  - Basic Metabolic Panel  - Hepatic Function Panel  - CBC Auto Differential  - Lipase  - Amylase  - Celiac Screen with Reflex    Patient does have both vitiligo and hypothyroidism, increasing her chance of celiac disease. Referring to Dr. Gabino Traore for further eval  - [place], [address] [facility], Gastroenterology, [facility]      2. Acquired hypothyroidism  Patient to return in 1 month to repeat TSH as thyroid medicine was adjusted 2 and half weeks ago.        Pap smear normal with neg HPV on 1/6/20 (will abstract from [facility]-everywhere)

## 2020-07-28 LAB — TISSUE TRANSGLUTAMINASE IGA: <0.5 U/ML (ref 0–14)

## 2020-11-25 ENCOUNTER — NURSE ONLY (OUTPATIENT)
Dept: FAMILY MEDICINE CLINIC | Age: 40
End: 2020-11-25

## 2020-11-25 LAB — TSH REFLEX: 2.59 UIU/ML (ref 0.27–4.2)

## 2020-11-25 NOTE — PROGRESS NOTES
Patient seen today for thyroid blood work. She wants to note that her fertility doc filled for last time, she is currently taking 100MG.

## 2020-11-30 RX ORDER — LEVOTHYROXINE SODIUM 100 MCG
100 TABLET ORAL DAILY
Qty: 90 TABLET | Refills: 3 | Status: SHIPPED | OUTPATIENT
Start: 2020-11-30 | End: 2021-07-27

## 2021-02-18 ENCOUNTER — VIRTUAL VISIT (OUTPATIENT)
Dept: FAMILY MEDICINE CLINIC | Age: 41
End: 2021-02-18
Payer: COMMERCIAL

## 2021-02-18 DIAGNOSIS — J01.90 ACUTE BACTERIAL SINUSITIS: Primary | ICD-10-CM

## 2021-02-18 DIAGNOSIS — B96.89 ACUTE BACTERIAL SINUSITIS: Primary | ICD-10-CM

## 2021-02-18 PROCEDURE — 99213 OFFICE O/P EST LOW 20 MIN: CPT | Performed by: NURSE PRACTITIONER

## 2021-02-18 RX ORDER — DESOGESTREL AND ETHINYL ESTRADIOL 0.15-0.03
1 KIT ORAL DAILY
COMMUNITY
End: 2021-12-13 | Stop reason: ALTCHOICE

## 2021-02-18 RX ORDER — AZITHROMYCIN 250 MG/1
250 TABLET, FILM COATED ORAL SEE ADMIN INSTRUCTIONS
Qty: 6 TABLET | Refills: 0 | Status: SHIPPED | OUTPATIENT
Start: 2021-02-18 | End: 2021-02-23

## 2021-03-02 ENCOUNTER — TELEPHONE (OUTPATIENT)
Dept: FAMILY MEDICINE CLINIC | Age: 41
End: 2021-03-02

## 2021-03-02 DIAGNOSIS — J01.90 ACUTE BACTERIAL SINUSITIS: Primary | ICD-10-CM

## 2021-03-02 DIAGNOSIS — B96.89 ACUTE BACTERIAL SINUSITIS: Primary | ICD-10-CM

## 2021-03-02 RX ORDER — PREDNISONE 10 MG/1
10 TABLET ORAL 2 TIMES DAILY
Qty: 10 TABLET | Refills: 0 | Status: SHIPPED | OUTPATIENT
Start: 2021-03-02 | End: 2021-03-07

## 2021-03-02 NOTE — TELEPHONE ENCOUNTER
Patient complains of continued post nasal drainage and head congestion. She did not notice improvement with Zpak. She had recent Covid test which was negative. Will treat with short course of prednisone. Discussed expected course and possible side effects. Notify office if symptoms do not resolve.

## 2021-03-12 ENCOUNTER — TELEPHONE (OUTPATIENT)
Dept: FAMILY MEDICINE CLINIC | Age: 41
End: 2021-03-12

## 2021-03-12 NOTE — TELEPHONE ENCOUNTER
Apologize to patient for not getting back sooner    Let her know on 2 doctors in today    Tell her I recommend urgent care for quicker evaluation, or I can see her Monday if she chooses    Please document call and then close encounter.   thanks

## 2021-03-12 NOTE — TELEPHONE ENCOUNTER
Subject: Appointment Request     Reason for Call: Urgent Cough Cold     QUESTIONS   Type of Appointment? Established Patient   Reason for appointment request? No appointments available during search   Additional Information for Provider? Patient has been having on going   congestion and headaches since mid February. Patient states the congestion   in her sinuses to her ears recently.   ---------------------------------------------------------------------------   --------------   CALL BACK INFO   What is the best way for the office to contact you? OK to leave message on   voicemail   Preferred Call Back Phone Number? [phone]   ---------------------------------------------------------------------------   --------------   SCRIPT ANSWERS   Relationship to Patient? Self   Appointment reason? Symptomatic   Select script based on patient symptoms? Adult Cough/Cold Symptoms [Runny   Nose    Sore Throat    Flu    Sinus    Sinus Infection    Upper Respiratory Infection [URI]    Congestion]   Are you currently unable to finish sentences due to any difficulty   breathing? No   Are you unable to swallow liquids? No   Are you having fevers (100.4 or greater)    chills    or sweats? Yes   Do you have COPD    asthma or a chronic lung condition? No   Have you been diagnosed with    tested for    or told that you are suspected of having COVID-19 (Coronavirus)? Yes   Did your symptoms begin or have you been tested for COVID-19 in the last   10 days? No   Have you had a fever or taken medication to treat a fever within the past   3 days? No   Have you had a cough    shortness of breath or flu-like symptoms within the past 3 days? No   Do you currently have flu-like symptoms including fever or chills    cough    shortness of breath    or difficulty breathing    or new loss of taste or smell? No   (Service Expert - click yes below to proceed with [id number] As Usual   Scheduling)?  Yes

## 2021-07-27 ENCOUNTER — OFFICE VISIT (OUTPATIENT)
Dept: PRIMARY CARE CLINIC | Age: 41
End: 2021-07-27
Payer: COMMERCIAL

## 2021-07-27 ENCOUNTER — HOSPITAL ENCOUNTER (OUTPATIENT)
Age: 41
Discharge: HOME | End: 2021-07-27
Payer: COMMERCIAL

## 2021-07-27 VITALS
HEART RATE: 95 BPM | HEIGHT: 65 IN | DIASTOLIC BLOOD PRESSURE: 82 MMHG | SYSTOLIC BLOOD PRESSURE: 124 MMHG | WEIGHT: 151.8 LBS | OXYGEN SATURATION: 100 % | BODY MASS INDEX: 25.29 KG/M2 | TEMPERATURE: 97.9 F

## 2021-07-27 DIAGNOSIS — Z11.59 NEED FOR HEPATITIS C SCREENING TEST: ICD-10-CM

## 2021-07-27 DIAGNOSIS — Z00.00 WELL ADULT EXAM: Primary | ICD-10-CM

## 2021-07-27 DIAGNOSIS — E03.9 ACQUIRED HYPOTHYROIDISM: ICD-10-CM

## 2021-07-27 DIAGNOSIS — Z13.220 SCREENING FOR HYPERLIPIDEMIA: ICD-10-CM

## 2021-07-27 DIAGNOSIS — Z00.00 WELL ADULT EXAM: ICD-10-CM

## 2021-07-27 DIAGNOSIS — Z13.1 SCREENING FOR DIABETES MELLITUS: ICD-10-CM

## 2021-07-27 DIAGNOSIS — Z11.4 SCREENING FOR HIV (HUMAN IMMUNODEFICIENCY VIRUS): ICD-10-CM

## 2021-07-27 LAB
CHOLESTEROL, TOTAL: 207 MG/DL (ref 0–199)
GLUCOSE BLD-MCNC: 72 MG/DL (ref 70–99)
HDLC SERPL-MCNC: 57 MG/DL (ref 40–60)
HEPATITIS C ANTIBODY INTERPRETATION: NORMAL
LDL CHOLESTEROL CALCULATED: 119 MG/DL
TRIGL SERPL-MCNC: 153 MG/DL (ref 0–150)
VLDLC SERPL CALC-MCNC: 31 MG/DL

## 2021-07-27 PROCEDURE — 36415 COLL VENOUS BLD VENIPUNCTURE: CPT

## 2021-07-27 PROCEDURE — 86803 HEPATITIS C AB TEST: CPT

## 2021-07-27 PROCEDURE — 99395 PREV VISIT EST AGE 18-39: CPT | Performed by: FAMILY MEDICINE

## 2021-07-27 PROCEDURE — 87390 HIV-1 AG IA: CPT

## 2021-07-27 PROCEDURE — 86701 HIV-1ANTIBODY: CPT

## 2021-07-27 PROCEDURE — 86702 HIV-2 ANTIBODY: CPT

## 2021-07-27 PROCEDURE — 83036 HEMOGLOBIN GLYCOSYLATED A1C: CPT

## 2021-07-27 PROCEDURE — 80061 LIPID PANEL: CPT

## 2021-07-27 PROCEDURE — 82947 ASSAY GLUCOSE BLOOD QUANT: CPT

## 2021-07-27 RX ORDER — PROGESTERONE 100 MG/1
100 INSERT VAGINAL 2 TIMES DAILY
COMMUNITY
End: 2021-12-13 | Stop reason: ALTCHOICE

## 2021-07-27 RX ORDER — ESTRADIOL 2 MG/1
TABLET ORAL
COMMUNITY
Start: 2021-07-25 | End: 2021-12-13 | Stop reason: ALTCHOICE

## 2021-07-27 RX ORDER — LEVOTHYROXINE SODIUM 125 MCG
TABLET ORAL
COMMUNITY
Start: 2021-06-28 | End: 2021-12-20 | Stop reason: DRUGHIGH

## 2021-07-27 ASSESSMENT — PATIENT HEALTH QUESTIONNAIRE - PHQ9
SUM OF ALL RESPONSES TO PHQ QUESTIONS 1-9: 0
2. FEELING DOWN, DEPRESSED OR HOPELESS: 0
SUM OF ALL RESPONSES TO PHQ9 QUESTIONS 1 & 2: 0
SUM OF ALL RESPONSES TO PHQ QUESTIONS 1-9: 0
1. LITTLE INTEREST OR PLEASURE IN DOING THINGS: 0
SUM OF ALL RESPONSES TO PHQ QUESTIONS 1-9: 0

## 2021-07-27 NOTE — PROGRESS NOTES
PROGRESS NOTE     Sid Rodriguez MD  [address] [facility]. [address], [address] [place] [address]         Phone: [phone]    History and Physical      Charito Raphael  YOB: 1980    Date of Service:  7/27/2021    Chief Complaint:   Charito Raphael is a 47 y.o. female who presents for complete physical examination. HPI:     Fertility doc increased synthroid 4 weeks ago. Too early to check TSH. Just had round of IVF 8 days ago.     Wt Readings from Last 3 Encounters:   07/27/21 151 lb 12.8 oz (68.9 kg)   07/27/20 154 lb (69.9 kg)   01/15/20 142 lb (64.4 kg)     BP Readings from Last 3 Encounters:   07/27/21 124/82   07/27/20 128/78   01/15/20 122/73       Patient Active Problem List   Diagnosis    Infertility    Graves disease    Hypothyroidism    Vitiligo    Psoriasis    Keratoconus of both eyes    Corneal transplant rejection       Preventive Care:  Health Maintenance   Topic Date Due    Hepatitis C screen  Never done    Varicella vaccine (1 of 2 - 2-dose childhood series) Never done    Pneumococcal 0-64 years Vaccine (1 of 4 - PCV13) Never done    HIV screen  Never done    Flu vaccine (1) 09/01/2021    TSH testing  11/25/2021    Cervical cancer screen  01/06/2023    DTaP/Tdap/Td vaccine (2 - Td or Tdap) 12/19/2026    COVID-19 Vaccine  Completed    Hepatitis A vaccine  Aged Out    Hepatitis B vaccine  Aged Out    Hib vaccine  Aged Out    Meningococcal (ACWY) vaccine  Aged Out      Hx abnormal PAP: yes - prior to 2010, but things were just monitored and cleared up on their own  Sexual activity:  and currently did IVF   Self-breast exams: yes (disussed this could do more harm than good)  Previous DEXA scan: no  Last eye exam: June 2021, normal  Exercise: walks 2 miles daily  Seatbelt use: yes  Lipid panel:   Lab Results   Component Value Date    CHOL 196 09/30/2019      Spouse name: Not on file    Number of children: Not on file    Years of education: Not on file    Highest education level: Not on file   Occupational History    Not on file   Tobacco Use    Smoking status: Never Smoker    Smokeless tobacco: Never Used   Vaping Use    Vaping Use: Never used   Substance and Sexual Activity    Alcohol use: Yes     Alcohol/week: 1.0 standard drinks     Types: 1 Glasses of wine per week     Comment: socially    Drug use: No    Sexual activity: Yes     Partners: Male     Comment:    Other Topics Concern    Not on file   Social History Narrative    Not on file     Social Determinants of Health     Financial Resource Strain:     Difficulty of Paying Living Expenses:    Food Insecurity:     Worried About Running Out of Food in the Last Year:     [address] in the Last Year:    Transportation Needs:     Lack of Transportation (Medical):      Lack of Transportation (Non-Medical):    Physical Activity:     Days of Exercise per Week:     Minutes of Exercise per Session:    Stress:     Feeling of Stress :    Social Connections:     Frequency of Communication with Friends and Family:     Frequency of Social Gatherings with Friends and Family:     Attends Hindu Services:     Active Member of Clubs or Organizations:     Attends Club or Organization Meetings:     Marital Status:    Intimate Partner Violence:     Fear of Current or Ex-Partner:     Emotionally Abused:     Physically Abused:     Sexually Abused:        Review of Systems:  Constitutional:  Negative for activity or appetite change, fever or fatigue  HENT:  Negative for congestion, sinus pressure, or rhinorrhea  Eyes:  Negative for eye pain or visual changes  Resp:  Negative for SOB, chest tightness, cough  Cardiovascular: Negative for CP, palpitations, MAJANO, orthopnea, PND, LE edema  Gastrointestinal: Negative for abd pain, melena, BRBPR, N/V/D  Endocrine:  Negative for polydipsia and polyuria  :  Negative for dysuria, flank pain or urinary frequency  Musculoskeletal:  Negative for back pain or myalgias  Neuro:  Negative for dizziness or lightheadedness  Psych: negative for depression or anxiety      Physical Exam:   Vitals:    07/27/21 0827   BP: 124/82   Pulse: 95   Temp: 97.9 °F (36.6 °C)   TempSrc: Temporal   SpO2: 100%   Weight: 151 lb 12.8 oz (68.9 kg)   Height: 5' 5\" (1.651 m)     Body mass index is 25.26 kg/m². Constitutional: She is oriented to person, place, and time. She appears well-developed and well-nourished. No distress. HEENT:   Head: Normocephalic and atraumatic. Right Ear: Tympanic membrane, external ear and ear canal normal.   Left Ear: Tympanic membrane, external ear and ear canal normal.   Nose: Nose normal.   Mouth/Throat: Oropharynx is clear and moist, and mucous membranes are normal.  There is no cervical adenopathy. Eyes: Conjunctivae and extraocular motions are normal. Pupils are equal, round, and reactive to light. Neck: Neck supple. No JVD present. Carotid bruit is not present. No mass and no thyromegaly present. Cardiovascular: Normal rate, regular rhythm, normal heart sounds and intact distal pulses. Exam reveals no gallop and no friction rub. No murmur heard. Pulmonary/Chest: Effort normal and breath sounds normal. No respiratory distress. She has no wheezes, rhonchi or rales. Abdominal: Soft, non-tender. Bowel sounds and aorta are normal. She exhibits no organomegaly, mass or bruit. Genitourinary: performed by gynecologist.  Breast exam:  Performed by gynecologist.  Musculoskeletal: Normal range of motion, no synovitis. She exhibits no edema. Neurological: She is alert and oriented to person, place, and time. She has normal reflexes. No cranial nerve deficit. Coordination normal.   Skin: Skin is warm and dry. There is no rash or erythema. No suspicious lesions noted. Psychiatric: She has a normal mood and affect.  Her speech is normal and behavior is normal. Judgment, cognition and memory are normal.     Assessment/Plan:    Malaika Gomes was seen today for annual exam.    Diagnoses and all orders for this visit:    Well adult exam  Normal exam.  Patient to continue healthy lifestyle. Patient is up-to-date with Pap smear: Normal pap smear with negative HPV 1/6/20. Checking fasted lipid panel fasting glucose, HIV screen, and hepatitis C screen. Acquired hypothyroidism  Patient was adjusted 4 weeks ago by infertility physician. Too early to repeat TSH. Patient aware should repeat in the next 2 to 4 weeks.

## 2021-07-28 LAB
ESTIMATED AVERAGE GLUCOSE: 114 MG/DL
HBA1C MFR BLD: 5.6 %
HIV AG/AB: NORMAL
HIV ANTIGEN: NORMAL
HIV-1 ANTIBODY: NORMAL
HIV-2 AB: NORMAL

## 2021-09-19 ENCOUNTER — TELEPHONE (OUTPATIENT)
Dept: PRIMARY CARE CLINIC | Age: 41
End: 2021-09-19

## 2021-09-20 NOTE — TELEPHONE ENCOUNTER
Pt paged today at 2:22 pm  Pt positive with COVID 19 in preceding week, day 10 of quarantine.  and 3 y/o twin children also positive  Pt with tachycardia and leg/thihg pressure that is getting worse and fatigue/weakness/shakiness. Discussed with patient for approximately 10 minutes 31 seconds    Recommend patient go to ER for eval to rule out DVT/PE and ensure hemodynamically stable. Otherwise expectant management. Told to return call if more questions. Edgar Ortiz.  Sharon Varela., DO  9/19/2021

## 2021-10-28 ENCOUNTER — E-VISIT (OUTPATIENT)
Dept: PRIMARY CARE CLINIC | Age: 41
End: 2021-10-28
Payer: COMMERCIAL

## 2021-10-28 DIAGNOSIS — J01.90 ACUTE BACTERIAL SINUSITIS: Primary | ICD-10-CM

## 2021-10-28 DIAGNOSIS — B96.89 ACUTE BACTERIAL SINUSITIS: Primary | ICD-10-CM

## 2021-10-28 PROCEDURE — 99422 OL DIG E/M SVC 11-20 MIN: CPT | Performed by: INTERNAL MEDICINE

## 2021-10-28 RX ORDER — AMOXICILLIN AND CLAVULANATE POTASSIUM 875; 125 MG/1; MG/1
1 TABLET, FILM COATED ORAL 2 TIMES DAILY
Qty: 20 TABLET | Refills: 0 | Status: SHIPPED | OUTPATIENT
Start: 2021-10-28 | End: 2021-11-07

## 2021-10-28 RX ORDER — FLUTICASONE PROPIONATE 50 MCG
2 SPRAY, SUSPENSION (ML) NASAL DAILY
Qty: 16 G | Refills: 0 | Status: SHIPPED | OUTPATIENT
Start: 2021-10-28

## 2021-10-28 ASSESSMENT — LIFESTYLE VARIABLES: SMOKING_STATUS: NO, I'VE NEVER SMOKED

## 2021-10-28 NOTE — PROGRESS NOTES
I think that you have a sinus infection. The antibiotic Augmentin has been sent to the pharmacy. I have also sent Flonase to the pharmacy to help with relief of your nasal congestion. Please use both medications as directed. Follow-up with your primary care physician.     11 to 20 minutes were spent on this E visit

## 2021-12-06 ENCOUNTER — TELEPHONE (OUTPATIENT)
Dept: PRIMARY CARE CLINIC | Age: 41
End: 2021-12-06

## 2021-12-06 NOTE — TELEPHONE ENCOUNTER
Lm informing pt that an appointment has been scheduled and to give the office a call if appointment time does not work.

## 2021-12-06 NOTE — TELEPHONE ENCOUNTER
Pt requested appt for knee evaluation. She is having pain. Let her know my partner Dr. Lina Leigh has openings the morning of December 13 and he is also a board-certified sports medicine doctor. I placed her on a schedule at 8 AM to save the spot. Please remove if she does not want it. Route back with info.

## 2021-12-13 ENCOUNTER — OFFICE VISIT (OUTPATIENT)
Dept: PRIMARY CARE CLINIC | Age: 41
End: 2021-12-13
Payer: COMMERCIAL

## 2021-12-13 ENCOUNTER — HOSPITAL ENCOUNTER (OUTPATIENT)
Age: 41
Discharge: HOME | End: 2021-12-13
Payer: COMMERCIAL

## 2021-12-13 VITALS
HEIGHT: 65 IN | WEIGHT: 146.4 LBS | BODY MASS INDEX: 24.39 KG/M2 | OXYGEN SATURATION: 97 % | SYSTOLIC BLOOD PRESSURE: 116 MMHG | TEMPERATURE: 97.4 F | HEART RATE: 87 BPM | DIASTOLIC BLOOD PRESSURE: 78 MMHG

## 2021-12-13 DIAGNOSIS — Z00.00 HEALTHCARE MAINTENANCE: ICD-10-CM

## 2021-12-13 DIAGNOSIS — M22.2X2 PATELLOFEMORAL PAIN SYNDROME OF LEFT KNEE: Primary | ICD-10-CM

## 2021-12-13 DIAGNOSIS — E03.9 ACQUIRED HYPOTHYROIDISM: ICD-10-CM

## 2021-12-13 LAB
T4 FREE: 1.7 NG/DL (ref 0.9–1.8)
TSH REFLEX: 0.02 UIU/ML (ref 0.27–4.2)

## 2021-12-13 PROCEDURE — 36415 COLL VENOUS BLD VENIPUNCTURE: CPT

## 2021-12-13 PROCEDURE — 84480 ASSAY TRIIODOTHYRONINE (T3): CPT

## 2021-12-13 PROCEDURE — 84439 ASSAY OF FREE THYROXINE: CPT

## 2021-12-13 PROCEDURE — 84443 ASSAY THYROID STIM HORMONE: CPT

## 2021-12-13 PROCEDURE — 99213 OFFICE O/P EST LOW 20 MIN: CPT | Performed by: FAMILY MEDICINE

## 2021-12-13 RX ORDER — CLOBETASOL PROPIONATE 0.5 MG/G
CREAM TOPICAL
COMMUNITY
Start: 2021-12-07

## 2021-12-13 RX ORDER — MELOXICAM 15 MG/1
15 TABLET ORAL DAILY
Qty: 14 TABLET | Refills: 3 | Status: SHIPPED | OUTPATIENT
Start: 2021-12-13

## 2021-12-13 SDOH — ECONOMIC STABILITY: FOOD INSECURITY: WITHIN THE PAST 12 MONTHS, YOU WORRIED THAT YOUR FOOD WOULD RUN OUT BEFORE YOU GOT MONEY TO BUY MORE.: NEVER TRUE

## 2021-12-13 SDOH — ECONOMIC STABILITY: FOOD INSECURITY: WITHIN THE PAST 12 MONTHS, THE FOOD YOU BOUGHT JUST DIDN'T LAST AND YOU DIDN'T HAVE MONEY TO GET MORE.: NEVER TRUE

## 2021-12-13 ASSESSMENT — SOCIAL DETERMINANTS OF HEALTH (SDOH): HOW HARD IS IT FOR YOU TO PAY FOR THE VERY BASICS LIKE FOOD, HOUSING, MEDICAL CARE, AND HEATING?: NOT HARD AT ALL

## 2021-12-13 ASSESSMENT — ENCOUNTER SYMPTOMS
DIARRHEA: 0
CONSTIPATION: 0
SHORTNESS OF BREATH: 0
ABDOMINAL PAIN: 0

## 2021-12-13 NOTE — PROGRESS NOTES
[address] [facility]                                             [address] [place] [address], [address] [place] [address]        Phone: [phone]      Name:  Félix Morales  :    1980    Consultants:   Patient Care Team:  Paco Chávez MD as PCP - General (Family Medicine)  Paco Chávez MD as PCP - [facility] Empaneled Provider    Chief Complaint:     Félix Morales is a 46 y.o. female  who presents today for an established patient care visit with Personalized Prevention Plan Services as noted below. HPI:     53-year-old female seen in the office today for assessment of left knee pain. Patient states that beginning of the summer she noticed she had some pain to her anterior left knee. Patient states that over the last month or so she has noticed anytime that she walks upstairs she feels as if her knee is going to explode. 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 Patient has not taken any over-the-counter medications or done anything to help at this point because it is only with steps and squatting. Patient does state that her pain gets worse with squatting and steps and gets better if she is not doing those activities. Patient states she is never injured her knee, she is not athletic, and does not work out. Patient also denies any issues with sitting with her knee bent for a long period of time.       Patient Active Problem List   Diagnosis    Infertility    Graves disease    Hypothyroidism    Vitiligo    Psoriasis    Keratoconus of both eyes    Corneal transplant rejection         Past Medical History:    Past Medical History:   Diagnosis Date    Abnormal thyroid blood test     Corneal transplant rejection     on steriod gtts    Graves disease     removed secondary to high thyroid, and infertility problems    Hypothyroidism     on replacement after removal of thyroid for graves    Infertility     Keratoconus of both eyes     had cornea replacement right eye april 2014. will need in other eye as wekk    Palpitation     Psoriasis     Vitiligo        Past Surgical History:  Past Surgical History:   Procedure Laterality Date    BREAST SURGERY  2006    saline    CORNEAL TRANSPLANT Right 2014    secondary to keratoconus    THYROIDECTOMY, COMPLETION  june 2015    WISDOM TOOTH EXTRACTION         Home Meds:  Prior to Visit Medications    Medication Sig Taking?  Authorizing Provider   clobetasol (TEMOVATE) 0.05 % cream   Historical Provider, MD   fluticasone (FLONASE) 50 MCG/ACT nasal spray 2 sprays by Each Nostril route daily  Marcial Ramos MD   SYNTHROID 125 MCG tablet   Historical Provider, MD       Allergies:    Sulfa antibiotics    Family History:       Problem Relation Age of Onset    Diabetes Mother     Other Mother         Hypothyroid    High Cholesterol Mother     Diabetes Maternal Grandmother     Thyroid Disease Maternal Aunt         grave         Health Maintenance Completed:  Health Maintenance   Topic Date Due    Varicella vaccine (1 of 2 - 2-dose childhood series) Never done    Flu vaccine (1) 09/01/2021    TSH testing  11/25/2021    Cervical cancer screen  01/06/2023    DTaP/Tdap/Td vaccine (2 - Td or Tdap) 12/19/2026    COVID-19 Vaccine  Completed    Hepatitis C screen  Completed    HIV screen  Completed    Hepatitis A vaccine  Aged Out    Hepatitis B vaccine  Aged Out    Hib vaccine  Aged Out    Meningococcal (ACWY) vaccine  Aged Out    Pneumococcal 0-64 years Vaccine  Aged [employer] History   Administered Date(s) Administered    COVID-19, Pfizer, PF, 30mcg/0.3mL 02/20/2021, 03/13/2021, 11/22/2021    Influenza Vaccine, unspecified formulation 09/23/2016    Influenza Virus Vaccine 09/16/2014, 10/02/2015, 09/23/2016, 10/24/2017    Influenza, Quadv, IM, PF (6 mo and older Fluzone, Flulaval, Fluarix, and 3 yrs and older Afluria) 09/30/2019    Td, unspecified formulation 02/01/2003    Tdap (Boostrix, Adacel) 12/19/2016         Review of Systems:  Review of Systems   Constitutional: Negative for fatigue and fever. Respiratory: Negative for shortness of breath. Cardiovascular: Negative for chest pain and leg swelling. Gastrointestinal: Negative for abdominal pain, constipation and diarrhea. Musculoskeletal:        See hpi   Skin: Negative for rash. Neurological: Negative for headaches. Physical Exam:   Vitals:    12/13/21 0756   BP: 116/78   Pulse: 87   Temp: 97.4 °F (36.3 °C)   TempSrc: Temporal   SpO2: 97%   Weight: 146 lb 6.4 oz (66.4 kg)   Height: 5' 5\" (1.651 m)     Body mass index is 24.36 kg/m². Wt Readings from Last 3 Encounters:   12/13/21 146 lb 6.4 oz (66.4 kg)   07/27/21 151 lb 12.8 oz (68.9 kg)   07/27/20 154 lb (69.9 kg)       BP Readings from Last 3 Encounters:   12/13/21 116/78   07/27/21 124/82   07/27/20 128/78       Physical Exam  Constitutional:       Appearance: Normal appearance. Pulmonary:      Effort: Pulmonary effort is normal.   Musculoskeletal:      Comments: Patient with full active and passive range of motion to the left knee. Crepitus noted with patellar grind although not painful. Negative patellar apprehension, negative Lachman testing, negative anterior and posterior drawer testing, negative Lucio's testing, negative varus valgus stress testing. Distal sensation intact with 2+ DP and PT pulses noted. Neurological:      Mental Status: She is alert. Psychiatric:         Mood and Affect: Mood normal.         Behavior: Behavior normal.         Thought Content: Thought content normal.         Judgment: Judgment normal.              Lab Review:   No visits with results within 2 Month(s) from this visit.    Latest known visit with results is:   Hospital Outpatient Visit on 07/27/2021   Component Date Value    Hep C Ab Interp 07/27/2021 Non-reactive     HIV Ag/Ab 07/27/2021 Non-Reactive     HIV-1 Antibody 07/27/2021 Non-Reactive     HIV ANTIGEN 07/27/2021 Non-Reactive     HIV-2 Ab 07/27/2021 Non-Reactive     Glucose 07/27/2021 72     Cholesterol, Total 07/27/2021 207*    Triglycerides 07/27/2021 153*    HDL 07/27/2021 57     LDL Calculated 07/27/2021 119*    VLDL Cholesterol Calcula* 07/27/2021 31     Hemoglobin A1C 07/27/2021 5.6     eAG 07/27/2021 114.0           Assessment/Plan:  Patti Hernandez was seen today for knee pain. Diagnoses and all orders for this visit:    Patellofemoral pain syndrome of left knee  -     External Referral To Physical Therapy    Acquired hypothyroidism  -     TSH with Reflex; Future    Healthcare maintenance  -     Mumps, Measles, Rubella, and Varicella Zoster, IgG; Future    61-year-old female with    1. Patellofemoral pain syndrome, left knee. Patient will be started in physical therapy for patellar stabilization exercises. Patient will also be given meloxicam 15 mg to take once daily with food for 10 days. GI precautions discussed with patient in detail. 2.  Hypothyroidism. Patient will have TSH with reflex obtained today. Patient will continue levothyroxine 125 mcg until labs are resulted. 3.  Health maintenance. Patient is due for varicella zoster titer to see if she needs booster. Patient was advised to obtain flu booster. Patient scheduled to follow-up with her primary care provider for well adult visit in July 2022.     Health Maintenance Due:  Health Maintenance Due   Topic Date Due    Varicella vaccine (1 of 2 - 2-dose childhood series) Never done    Flu vaccine (1) 09/01/2021    TSH testing  11/25/2021          Health care decision maker:  <72years old      Health Maintenance: (USPSTF Recommendations)  (F) Breast Cancer Screen: (40-49 (C), 50-74 biennial screening mammogram (B))  (F) Cervical Cancer Screen: (21-29 q3yr cytology alone; 30-65 q3yr cytology alone, q5yr with hrHPV alone, or q5yr cytology+hrHPV (A))  (M) Prostate Cancer Screen: (54-79 yo discuss benefits/harm, does not recommend testing PSA in men >75 yo (D):   (M) AAA Screen: (men 73-69 yo who has ever smoked (B), consider in nonsmokers if high risk):  CRC/Colonoscopy Screening: (adults 39-53 (B), 50-75 (A))  Lung Ca Screening: Annual LDCT (+smoker age 49-80, smoked within 15 years, total of 20 pack yr history (B)):  DEXA Screen: (women >65 and older, <65 if at risk/postmenopausal (B))  HIV Screen: (16-65 yr old, and all pregnant patients (A)): Hep C Screen: (18-79 yr old (B)):  HCC Screen: (all pts with cirrhosis and high risk Hep B (US q6 mo)):  Immunizations:    RTC:  Return for July 2022 for Well Adult visit with Dr. Gerhard Langford/transcription disclaimer:  Much of this encounter note is electronic transcription/translation of spoken language to printed texts. The electronic translation of spoken language may be erroneous, or at times, nonsensical words or phrases may be inadvertently transcribed.   Although I have reviewed the note for such errors, some may still exist.

## 2021-12-14 LAB — T3 TOTAL: 1.16 NG/ML (ref 0.8–2)

## 2021-12-16 ENCOUNTER — PATIENT MESSAGE (OUTPATIENT)
Dept: PRIMARY CARE CLINIC | Age: 41
End: 2021-12-16

## 2021-12-16 DIAGNOSIS — E03.9 HYPOTHYROIDISM, UNSPECIFIED TYPE: Primary | ICD-10-CM

## 2021-12-17 NOTE — TELEPHONE ENCOUNTER
From: Darryn Sim  To: Dr. Ce Lowe: 12/16/2021 5:00 PM EST  Subject: Question regarding TSH WITH REFLEX    Yes I am currently on 125

## 2021-12-20 RX ORDER — LEVOTHYROXINE SODIUM 112 MCG
112 TABLET ORAL DAILY
Qty: 30 TABLET | Refills: 1 | Status: SHIPPED | OUTPATIENT
Start: 2021-12-20 | End: 2021-12-26 | Stop reason: SDUPTHER

## 2021-12-23 ENCOUNTER — TELEPHONE (OUTPATIENT)
Dept: PRIMARY CARE CLINIC | Age: 41
End: 2021-12-23

## 2021-12-23 DIAGNOSIS — B96.89 BACTERIAL SINUSITIS: Primary | ICD-10-CM

## 2021-12-23 DIAGNOSIS — J32.9 BACTERIAL SINUSITIS: Primary | ICD-10-CM

## 2021-12-23 RX ORDER — AMOXICILLIN AND CLAVULANATE POTASSIUM 875; 125 MG/1; MG/1
1 TABLET, FILM COATED ORAL 2 TIMES DAILY
Qty: 20 TABLET | Refills: 0 | Status: SHIPPED | OUTPATIENT
Start: 2021-12-23 | End: 2022-01-02

## 2021-12-23 NOTE — TELEPHONE ENCOUNTER
Pt has a sinus infection. Pt stated she usually gets these a couple times a year. Congested, sinus pain and pressure. Pt stated there is over a 3 hour wait at urgent care. Hoping to get a zpak before xmas. Is this possible?     Pt phone [phone]    Deshawn on chart

## 2021-12-23 NOTE — TELEPHONE ENCOUNTER
Pt should get tested for covid  If negative can start antibiotic    Gabby Luis Carlos Hernandez Che.,   12/23/2021   1. Bacterial sinusitis  - amoxicillin-clavulanate (AUGMENTIN) 875-125 MG per tablet; Take 1 tablet by mouth 2 times daily for 10 days  Dispense: 20 tablet;  Refill: 0

## 2021-12-26 RX ORDER — LEVOTHYROXINE SODIUM 112 MCG
112 TABLET ORAL DAILY
Qty: 30 TABLET | Refills: 1 | Status: SHIPPED | OUTPATIENT
Start: 2021-12-26

## 2022-07-12 ENCOUNTER — OFFICE VISIT (OUTPATIENT)
Dept: DERMATOLOGY | Age: 42
End: 2022-07-12
Payer: COMMERCIAL

## 2022-07-12 DIAGNOSIS — L80 VITILIGO: ICD-10-CM

## 2022-07-12 DIAGNOSIS — D22.9 MULTIPLE NEVI: Primary | ICD-10-CM

## 2022-07-12 DIAGNOSIS — L40.9 PSORIASIS: ICD-10-CM

## 2022-07-12 DIAGNOSIS — L81.1 MELASMA: ICD-10-CM

## 2022-07-12 DIAGNOSIS — L81.4 LENTIGINES: ICD-10-CM

## 2022-07-12 PROCEDURE — 99214 OFFICE O/P EST MOD 30 MIN: CPT | Performed by: DERMATOLOGY

## 2022-07-12 NOTE — PROGRESS NOTES
melanoma. No personal hx of skin cancer. She wears sunscreen regularly and never uses tanning beds. No personal or family hx of skin cancer. She is a  at [employer]. Review of Systems:  Gen: Feels well, good sense of health. Skin: No changing moles or lesions. Past Medical History, Family History, Surgical History, Medications and Allergies reviewed. Past Medical History:   Diagnosis Date    Abnormal thyroid blood test     Corneal transplant rejection     on steriod gtts    Graves disease     removed secondary to high thyroid, and infertility problems    Hypothyroidism     on replacement after removal of thyroid for graves    Infertility     Keratoconus of both eyes     had cornea replacement right eye april 2014. will need in other eye as wekk    Palpitation     Psoriasis     Vitiligo        Past Surgical History:   Procedure Laterality Date    BREAST SURGERY  2006    saline    CORNEAL TRANSPLANT Right 2014    secondary to keratoconus    THYROIDECTOMY, COMPLETION  june 2015    WISDOM TOOTH EXTRACTION         Outpatient Medications Marked as Taking for the 7/12/22 encounter (Office Visit) with Kathryn Gómez MD   Medication Sig Dispense Refill    SYNTHROID 112 MCG tablet Take 1 tablet by mouth Daily 30 tablet 1       Allergies   Allergen Reactions    Sulfa Antibiotics     Prozac [Fluoxetine Hcl] Nausea And Vomiting     Elevated heart rate. Dizzy         Physical Examination     Gen, well-appearing  FSE today    trunk and extremities with scattered brown macules and papules; scars on the back  Knees, Elbows with scaly salmon-colored thin plaques  L upper eyelid and canthal area with finely scaly skin  Face, Trunk > exrtremities with depigmented patches and mottled tan/light brown patches - has makeup on to difficult to assess full extent  L cheek with tan macules    Baseline photo          Assessment and Plan     1.   Benign-appearing nevi and lentigines  - Monitor for ABCD's of MM and si/sx of NMSC  Continue sun protection - OTC sunscreen with SPF 30-50+ recommended and reviewed usage  Encouraged skin check yearly (sooner if indicated), self checks  - monitor - photo of back taken    2. Psoriasis, mild and focal, flaring on the eyelid and intolerant of elidel and protopic   - cont enstilar for the arms/legs flares; ed s/misuse  - discussed risk with enstilar on eyelids - risk cataract, atrophy, glaucoma with prolonged potent steroid use  - she will cont to use enstilar very occasionally for a few applications at a time prn more severe flares  - can try to use calcipotriene foam daily to minimize flares  - could also try changing to milder potency steroid w/ calcipotriene foam but may need to use more frequently    3. Vitiligo, multifocal, asx and chronic  - previously discussed excimer for the face in the past and referred to Dr. Blanca Duenas office for consideration of trx  - can try JASBIR inhibitor topically once approved    4. Lentigo - ed $100 for laser removal - L cheek  - ed risks, typical healing/response and no tanning    4b.  Melasma - worsening  - Has tried BBL at [facility]  - try [facility] compounded bleaching cream x 8 weeks  - consider peels, consider IPL here  - cont sun protection SPF 50+ daily

## 2022-07-18 ENCOUNTER — PATIENT MESSAGE (OUTPATIENT)
Dept: DERMATOLOGY | Age: 42
End: 2022-07-18

## 2022-07-25 NOTE — TELEPHONE ENCOUNTER
Manjit, Processor 7/25/2022 12:21 PM EDT    Good Afternoon,  I was able to order the bleaching cream, thank you so much! Also, I'm encouraged that Jabari Pastrana was approved to treat vitiligo last week. I think I would like to try it. I appreciate you thinking about me. Let me know what you need from me moving forward. Have a great day!   Alexis Mitchell

## 2022-07-27 RX ORDER — RUXOLITINIB 15 MG/G
CREAM TOPICAL
Qty: 60 G | Refills: 1 | Status: SHIPPED | OUTPATIENT
Start: 2022-07-27

## 2022-10-10 ENCOUNTER — OFFICE VISIT (OUTPATIENT)
Dept: DERMATOLOGY | Age: 42
End: 2022-10-10
Payer: COMMERCIAL

## 2022-10-10 DIAGNOSIS — L40.9 PSORIASIS: ICD-10-CM

## 2022-10-10 DIAGNOSIS — L80 VITILIGO: ICD-10-CM

## 2022-10-10 DIAGNOSIS — L81.1 MELASMA: Primary | ICD-10-CM

## 2022-10-10 PROCEDURE — 99213 OFFICE O/P EST LOW 20 MIN: CPT | Performed by: DERMATOLOGY

## 2022-10-10 RX ORDER — BUSPIRONE HYDROCHLORIDE 5 MG/1
5 TABLET ORAL 2 TIMES DAILY
COMMUNITY
Start: 2022-09-12 | End: 2022-12-11

## 2022-10-10 RX ORDER — LEVOTHYROXINE SODIUM 88 MCG
TABLET ORAL
COMMUNITY
Start: 2022-07-08

## 2022-10-10 NOTE — PROGRESS NOTES
[facility]  Jeff Canela MD  [phone]      Tiny Conn  1980    38 y.o. female     Date of Visit: 10/10/2022    Chief Complaint: f/u vitiligo and melasma  Chief Complaint   Patient presents with    Follow-up     Melasma-going well, stopped (only used for a week) using cream for vitiligo but cream for melasma working well (stopped x 1 month due to dryness then continued afterwards). Last seen: 7-2022  She had twins (boy and girl) in Feb 2017. Was going through grad school in recent years. History of Present Illness:    1. F/u melasma - she notes improvement w [facility] compounded bleaching cream since last seen 2-3 mos ago. Had some dryness with use so stopped for ~ 1 month then resumed. 2. F/u dermatitis vs psoraisis on the eyelid - resolved since starting buspar this summer. 3. F/u vitiligo - started on opzelura but decided not to continue after initial use d/t large surface areas involved with vitiligo. Had corneal transplant in the R eye a few years ago and this side doesn't flare. Melasma hx: She has worsening mottled brown/tan patches on the face and tan lesions on the L cheek that she might be interested in having removed for cosmetic reasons. Previously discussed laser. Tried BBL at [facility] w/o improvement. Her mom has a hx of NMSC. No known hx of melanoma. No personal hx of skin cancer. She wears sunscreen regularly and never uses tanning beds. No personal or family hx of skin cancer. She is a  at [employer]. Review of Systems:  Gen: Feels well, good sense of health. Skin: No changing moles or lesions. Past Medical History, Family History, Surgical History, Medications and Allergies reviewed.     Past Medical History:   Diagnosis Date    Abnormal thyroid blood test     Corneal transplant rejection     on steriod gtts    Graves disease     removed secondary to high thyroid, and infertility problems Hypothyroidism     on replacement after removal of thyroid for graves    Infertility     Keratoconus of both eyes     had cornea replacement right eye april 2014. will need in other eye as wekk    Palpitation     Psoriasis     Vitiligo        Past Surgical History:   Procedure Laterality Date    BREAST SURGERY  2006    saline    CORNEAL TRANSPLANT Right 2014    secondary to keratoconus    THYROIDECTOMY, COMPLETION  june 2015    WISDOM TOOTH EXTRACTION         Outpatient Medications Marked as Taking for the 10/10/22 encounter (Office Visit) with Yves Butler MD   Medication Sig Dispense Refill    busPIRone (BUSPAR) 5 MG tablet Take 5 mg by mouth 2 times daily      SYNTHROID 88 MCG tablet       OPZELURA 1.5 % CREA Apply bid to areas with vitiligo. 60 g 1    clobetasol (TEMOVATE) 0.05 % cream       fluticasone (FLONASE) 50 MCG/ACT nasal spray 2 sprays by Each Nostril route daily 16 g 0       Allergies   Allergen Reactions    Sulfa Antibiotics     Prozac [Fluoxetine Hcl] Nausea And Vomiting     Elevated heart rate. Dizzy         Physical Examination     Gen, well-appearing    Makeup on today but mottled brown patches on the face appear improved  Eyelids clear  Vitiligo not examined otw today    Baseline photo          Assessment and Plan     1. Melasma - improving with topical trx  - cont daily sun protection, SPF 50+   - cont cmpnded [facility] product - take a break for several mos this winter and then resume if needed   discussed series of peels     2. Psoriasis, improved on the eyelid    3. Vitiligo, multifocal, asx and chronic  - can use opzelura bid prn new area or for cosmetically sensitive areas like the face but she elected not use for now    See last note for other probs.

## 2023-07-31 ENCOUNTER — OFFICE VISIT (OUTPATIENT)
Dept: DERMATOLOGY | Age: 43
End: 2023-07-31

## 2023-07-31 DIAGNOSIS — L80 VITILIGO: ICD-10-CM

## 2023-07-31 DIAGNOSIS — L81.4 LENTIGINES: ICD-10-CM

## 2023-07-31 DIAGNOSIS — L81.1 MELASMA: ICD-10-CM

## 2023-07-31 DIAGNOSIS — D48.5 NEOPLASM OF UNCERTAIN BEHAVIOR OF SKIN: ICD-10-CM

## 2023-07-31 DIAGNOSIS — D22.9 MULTIPLE NEVI: Primary | ICD-10-CM

## 2023-07-31 DIAGNOSIS — L40.9 PSORIASIS: ICD-10-CM

## 2023-07-31 RX ORDER — BUSPIRONE HYDROCHLORIDE 10 MG/1
TABLET ORAL
COMMUNITY
Start: 2023-07-25

## 2023-07-31 NOTE — PATIENT INSTRUCTIONS
Biopsy Wound Care Instructions    Keep the bandage in place for 24 hours. Cleanse the wound with mild soapy water daily  Gently dry the area. Apply Vaseline or petroleum jelly to the wound using a cotton tipped applicator. Cover with a clean bandage. Repeat this process until the biopsy site is healed. If you had stitches placed, continue treating the site until the stitches are removed. Remember to make an appointment to return to have your stitches removed by our staff. You may shower and bathe as usual.       ** Biopsy results generally take around 7 business days to come back. If you have not heard from us by then, please call the office at [phone]. *Please note that biopsy results are released to both the patient and physician at the same time in [address] [place] [address]. Please allow time for your physician to review the results. One of our staff members will reach out to you with the results and plan.

## 2023-07-31 NOTE — PROGRESS NOTES
[facility]  Marcella Dowling MD  [phone]      Bharati Ken  1980    38 y.o. female     Date of Visit: 7/31/2023    Chief Complaint: moles, f/u vitiligo and psoriasis  Chief Complaint   Patient presents with    Skin Exam     Last seen: 7-2022 and   She had twins (boy and girl) in Feb 2017. Was going through grad school in recent years. History of Present Illness:    1. Here for evaluation of multiple asx pigmented lesions on the trunk and extremities, present for many years; no change in size/shape/color of any lesions; no bleeding lesions. She has had numerous lesions removed in the past when she lived in [address] [place] [address]. She is not sure if any were dysplastic. No probs with previous sites of removal.    2. She has a hx of focal psoriasis with scaly lesions on the knees, R elbow and L eyelid. Has tried multiple topical steroids in the past, no systemic trx. Elbows, knees and L eyelid continue to flare in recent years. Eyelid has been the most bothersome. Has tried protopic, elidel - both cause severe burning sensation on eyelid. [employer] works for other areas on the elbow. Has used enstilar a few times on the eyelid and it clears. Uses sometimes a tiny dab 1-2 times when it flares - every few weeks - mos at most; most times not at all. Tried calcipotriene foam since last seen - tolerable but didn't work as well. Had corneal transplant in the R eye a few years ago and this side doesn't flare. 3. She has a hx of vitiligo and thyroid probs. She has scattered depigmented patches on the trunk/hands +/- face but has had no recent trx. Unclear if definite new lesions recently but is particularly more noticeable with spring/summer sun exposure. 4. She has worsening mottled brown/tan patches on the face and tan lesions on the L cheek that she might be interested in having removed for cosmetic reasons. Previously discussed laser.   Tried BBL at [facility]

## 2023-08-03 LAB — DERMATOLOGY PATHOLOGY REPORT: NORMAL

## 2023-10-12 ENCOUNTER — PROCEDURE VISIT (OUTPATIENT)
Dept: DERMATOLOGY | Age: 43
End: 2023-10-12

## 2023-10-12 DIAGNOSIS — D23.71 DYSPLASTIC NEVUS OF LOWER EXTREMITY, RIGHT: Primary | ICD-10-CM

## 2023-10-12 NOTE — PROGRESS NOTES
[facility]  Epifanio Green MD  [phone]      Vita Knife  1980    41 y.o. female     Date of Visit: 10/12/2023    Chief Complaint: dysplastic nevus  Chief Complaint   Patient presents with    Procedure     Excision: Right Posterior Thigh- Dysplastic Nevus     Last seen: 7-2023  She had twins (boy and girl) in Feb 2017. 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 History of Present Illness:    1. Here for excision - R posterior thigh - moderately dysplatic nevus, not removed with bx 7-2023 (extended to peripheral margin)  No probs since bx. Her mom has a hx of NMSC. No known hx of melanoma. No personal hx of skin cancer. She wears sunscreen regularly and never uses tanning beds. No personal or family hx of skin cancer. Review of Systems:  Gen: Feels well, good sense of health. \    Past Medical History, Family History, Surgical History, Medications and Allergies reviewed. Past Medical History:   Diagnosis Date    Abnormal thyroid blood test     Corneal transplant rejection     on steriod gtts    Graves disease     removed secondary to high thyroid, and infertility problems    Hypothyroidism     on replacement after removal of thyroid for graves    Infertility     Keratoconus of both eyes     had cornea replacement right eye april 2014.   will need in other eye as wekk    Palpitation     Psoriasis     Vitiligo        Past Surgical History:   Procedure Laterality Date    BREAST SURGERY  2006    saline    CORNEAL TRANSPLANT Right 2014    secondary to keratoconus    THYROIDECTOMY, COMPLETION  june 2015    WISDOM TOOTH EXTRACTION         Outpatient Medications Marked as Taking for the 10/12/23 encounter (Procedure visit) with Luis Mcfarland MD   Medication Sig Dispense Refill    busPIRone (BUSPAR) 10 MG tablet       SYNTHROID 88 MCG tablet       clobetasol (TEMOVATE) 0.05 % cream          Allergies   Allergen Reactions    Sulfa Antibiotics

## 2023-10-12 NOTE — PATIENT INSTRUCTIONS
Biopsy Wound Care Instructions    Keep the bandage in place for 24 hours. Cleanse the wound with mild soapy water daily  Gently dry the area. Apply Vaseline or petroleum jelly to the wound using a cotton tipped applicator. Cover with a clean bandage. Repeat this process until the biopsy site is healed. If you had stitches placed, continue treating the site until the stitches are removed. Remember to make an appointment to return to have your stitches removed by our staff. You may shower and bathe as usual.       ** Biopsy results generally take around 7 business days to come back. If you have not heard from us by then, please call the office at [phone]. *Please note that biopsy results are released to both the patient and physician at the same time in [address] [place] [address]. Please allow time for your physician to review the results. One of our staff members will reach out to you with the results and plan.        SUTURES OUT IN 2 weeks

## 2023-10-17 LAB — DERMATOLOGY PATHOLOGY REPORT: NORMAL

## 2024-07-30 ENCOUNTER — OFFICE VISIT (OUTPATIENT)
Dept: DERMATOLOGY | Age: 44
End: 2024-07-30
Payer: COMMERCIAL

## 2024-07-30 DIAGNOSIS — L81.1 MELASMA: ICD-10-CM

## 2024-07-30 DIAGNOSIS — Z86.018 HISTORY OF DYSPLASTIC NEVUS: ICD-10-CM

## 2024-07-30 DIAGNOSIS — L80 VITILIGO: ICD-10-CM

## 2024-07-30 DIAGNOSIS — D22.9 MULTIPLE NEVI: Primary | ICD-10-CM

## 2024-07-30 DIAGNOSIS — L40.9 PSORIASIS: ICD-10-CM

## 2024-07-30 DIAGNOSIS — L81.4 LENTIGINES: ICD-10-CM

## 2024-07-30 PROCEDURE — 99214 OFFICE O/P EST MOD 30 MIN: CPT | Performed by: DERMATOLOGY

## 2024-07-30 RX ORDER — CETIRIZINE HYDROCHLORIDE 5 MG/1
5 TABLET ORAL DAILY
COMMUNITY

## 2025-07-01 NOTE — PROGRESS NOTES
 Infertility     Keratoconus of both eyes     had cornea replacement right eye april 2014. will need in other eye as wekk    Palpitation     Psoriasis     Vitiligo        Past Surgical History:   Procedure Laterality Date    BREAST SURGERY  2006    saline    CORNEAL TRANSPLANT Right 2014    secondary to keratoconus    THYROIDECTOMY, COMPLETION  june 2015    WISDOM TOOTH EXTRACTION         Outpatient Medications Marked as Taking for the 1/6/20 encounter (Office Visit) with Juvencio Tariq MD   Medication Sig Dispense Refill    tacrolimus (PROTOPIC) 0.1 % ointment Apply to affected area BID 60 g 3    ENSTILAR 0.005-0.064 % FOAM Apply to affected areas bid. 60 g 2    levothyroxine (SYNTHROID) 88 MCG tablet TAKE ONE TABLET BY MOUTH DAILY 90 tablet 3       Allergies   Allergen Reactions    Sulfa Antibiotics          Physical Examination     Gen, well-appearing  The following were examined and determined to be normal: Psych/Neuro, Scalp/hair, Gums/teeth/lips, Neck  The following were examined and determined to be abnormal: Head/face, Conjunctivae/eyelids     Eyelids clear except for mild erythema and fine scaly patch on the L upper    Baseline photo          Assessment and Plan     1.   Psoriasis, mild and focal, flaring on the eyelid and intolerant of elidel and protopic  - change to Calcipotriene oint  - HC 2.5 oint for the eyelids if not improving; ed eye risks, bony with [id number] use  - avoid enstilar for the eyes   - cont enstilar or Clobetasol - elbow, knees and hands until clear and prn flares; ed se/misuse      Hs of Vitiligo, multifocal, asx and chronic  - discussed excimer for the face in the past -  Again referred to Dr. Semaj Kinsey office for consideration of trx  - consider protopic or steroid prn new lesions    Previously discussed - Lentigo - $100 for laser removal - L cheek
[email or web address]

## 2025-07-31 ENCOUNTER — OFFICE VISIT (OUTPATIENT)
Age: 45
End: 2025-07-31
Payer: COMMERCIAL

## 2025-07-31 DIAGNOSIS — D22.9 MULTIPLE NEVI: Primary | ICD-10-CM

## 2025-07-31 DIAGNOSIS — L81.1 MELASMA: ICD-10-CM

## 2025-07-31 DIAGNOSIS — L40.9 PSORIASIS: ICD-10-CM

## 2025-07-31 DIAGNOSIS — L81.4 LENTIGINES: ICD-10-CM

## 2025-07-31 DIAGNOSIS — Z86.018 HISTORY OF DYSPLASTIC NEVUS: ICD-10-CM

## 2025-07-31 DIAGNOSIS — L80 VITILIGO: ICD-10-CM

## 2025-07-31 PROCEDURE — 99214 OFFICE O/P EST MOD 30 MIN: CPT | Performed by: DERMATOLOGY

## 2025-07-31 RX ORDER — ESCITALOPRAM OXALATE 5 MG/1
5 TABLET ORAL DAILY
COMMUNITY
Start: 2025-04-08